# Patient Record
Sex: MALE | Race: ASIAN | NOT HISPANIC OR LATINO | Employment: FULL TIME | URBAN - METROPOLITAN AREA
[De-identification: names, ages, dates, MRNs, and addresses within clinical notes are randomized per-mention and may not be internally consistent; named-entity substitution may affect disease eponyms.]

---

## 2018-12-07 ENCOUNTER — OFFICE VISIT (OUTPATIENT)
Dept: URGENT CARE | Facility: CLINIC | Age: 23
End: 2018-12-07

## 2018-12-07 VITALS
RESPIRATION RATE: 14 BRPM | HEIGHT: 69 IN | TEMPERATURE: 99.5 F | BODY MASS INDEX: 19.99 KG/M2 | OXYGEN SATURATION: 100 % | HEART RATE: 66 BPM | WEIGHT: 135 LBS | SYSTOLIC BLOOD PRESSURE: 104 MMHG | DIASTOLIC BLOOD PRESSURE: 76 MMHG

## 2018-12-07 DIAGNOSIS — S66.811A STRAIN OF METACARPOPHALANGEAL JOINT OF RIGHT HAND, INITIAL ENCOUNTER: ICD-10-CM

## 2018-12-07 DIAGNOSIS — K52.9 GASTROENTERITIS: Primary | ICD-10-CM

## 2018-12-07 PROCEDURE — 99204 OFFICE O/P NEW MOD 45 MIN: CPT | Performed by: PHYSICIAN ASSISTANT

## 2018-12-07 RX ORDER — DICYCLOMINE HCL 20 MG
20 TABLET ORAL EVERY 6 HOURS
Qty: 20 TAB | Refills: 0 | Status: SHIPPED | OUTPATIENT
Start: 2018-12-07 | End: 2018-12-12

## 2018-12-07 RX ORDER — ONDANSETRON 4 MG/1
4 TABLET, ORALLY DISINTEGRATING ORAL EVERY 8 HOURS PRN
Qty: 10 TAB | Refills: 0 | Status: SHIPPED | OUTPATIENT
Start: 2018-12-07 | End: 2018-12-10

## 2018-12-07 NOTE — LETTER
December 7, 2018       Patient: Hi Fox   YOB: 1995   Date of Visit: 12/7/2018         To Whom It May Concern:    It is my medical opinion that Hi Fox may be excused from work for the dates of 12/7/18-12/10/18.      If you have any questions or concerns, please don't hesitate to call [phone]          Sincerely,          Julio Garcia P.A.-C.  Electronically Signed

## 2018-12-08 NOTE — PROGRESS NOTES
Subjective:      Pt is a 22 y.o. male who presents with Abdominal Pressure (lower abdominal pain)            HPI  This is a new problem. The current episode started in the past 1-2 days. The problem occurs 2 to 4 times per day. The problem has been gradually worsening. The stool consistency is described as watery. The patient states that diarrhea awakens them from sleep. Nothing aggravates the symptoms. Risk factors include suspect food intake. They have tried anti-motility drug for the symptoms. The treatment provided mild relief. There is no history of bowel resection, inflammatory bowel disease, irritable bowel syndrome, malabsorption, a recent abdominal surgery or short gut syndrome.   Pt states for the last 1-2 days, they have had abd cramping, watery diarrhea, with nausea and vomiting. Pt denies blood or mucus in the stool. Pt suspects contaminated food as etiology. Pt states OTC Pepto is mildly helping.  Pt has not taken any Rx medications for this condition. PT states the pain is a 6/10, aching in nature and worse at night. Pt also notes injury to right 2nd knuckle last year and wants to see a specialist to evaluate it. Pt denies CP, SOB,  paresthesias, headaches, dizziness, change in vision, hives, or joint pain. The pt's medication list, problem list, and allergies have been evaluated and reviewed during today's visit.     PMH:  Negative per pt.      PSH:  Negative per pt.      Fam Hx:  the patient's family history is not pertinent to their current complaint      Soc HX:  Social History     Social History   • Marital status: Single     Spouse name: N/A   • Number of children: N/A   • Years of education: N/A     Occupational History   • Not on file.     Social History Main Topics   • Smoking status: Never Smoker   • Smokeless tobacco: Never Used   • Alcohol use Not on file   • Drug use: Unknown   • Sexual activity: Not on file     Other Topics Concern   • Not on file     Social History Narrative   • No  "narrative on file         Medications:    Current Outpatient Prescriptions:   •  dicyclomine (BENTYL) 20 MG Tab, Take 1 Tab by mouth every 6 hours for 5 days., Disp: 20 Tab, Rfl: 0  •  ondansetron (ZOFRAN ODT) 4 MG TABLET DISPERSIBLE, Take 1 Tab by mouth every 8 hours as needed for up to 3 days., Disp: 10 Tab, Rfl: 0      Allergies:  Amoxicillin and Penicillins    ROS  Constitutional: Negative for fever, chills and malaise/fatigue.   HENT: Negative for congestion and sore throat.    Eyes: Negative for blurred vision, double vision and photophobia.   Respiratory: Negative for cough and shortness of breath.  Cardiovascular: Negative for chest pain and palpitations.   Gastrointestinal: POS nausea, vomiting, abdominal pain, diarrhea   Genitourinary: Negative for dysuria and flank pain.   Musculoskeletal: POS for right knuckle deformity.   Skin: Negative for itching and rash.   Neurological: Negative for dizziness, tingling and headaches.   Endo/Heme/Allergies: Does not bruise/bleed easily.   Psychiatric/Behavioral: Negative for depression. The patient is not nervous/anxious.           Objective:     /76 (BP Location: Right arm, Patient Position: Sitting, BP Cuff Size: Adult)   Pulse 66   Temp 37.5 °C (99.5 °F) (Temporal)   Resp 14   Ht 1.753 m (5' 9\")   Wt 61.2 kg (135 lb)   SpO2 100%   BMI 19.94 kg/m²      Physical Exam   Abdominal: Soft. Normal appearance. He exhibits no shifting dullness, no distension, no pulsatile liver, no fluid wave, no abdominal bruit, no ascites, no pulsatile midline mass and no mass. Bowel sounds are increased. There is no hepatosplenomegaly. There is generalized tenderness. There is no rigidity, no rebound, no guarding, no CVA tenderness, no tenderness at McBurney's point and negative Larson's sign. No hernia.       Musculoskeletal:        Right hand: He exhibits deformity. He exhibits normal range of motion, no tenderness, no bony tenderness, normal capillary refill, no " laceration and no swelling. Normal sensation noted. Normal strength noted.        Hands:         Constitutional: PT is oriented to person, place, and time. PT appears well-developed and well-nourished. No distress.   HENT:   Head: Normocephalic and atraumatic.   Mouth/Throat: Oropharynx is clear and moist. No oropharyngeal exudate.   Eyes: Conjunctivae normal and EOM are normal. Pupils are equal, round, and reactive to light.   Neck: Normal range of motion. Neck supple. No thyromegaly present.   Cardiovascular: Normal rate, regular rhythm, normal heart sounds and intact distal pulses.  Exam reveals no gallop and no friction rub.    No murmur heard.  Pulmonary/Chest: Effort normal and breath sounds normal. No respiratory distress. PT has no wheezes. PT has no rales. Pt exhibits no tenderness.   Neurological: PT is alert and oriented to person, place, and time. PT has normal reflexes. No cranial nerve deficit.   Skin: Skin is warm and dry. No rash noted. PT is not diaphoretic. No erythema.       Psychiatric: PT has a normal mood and affect. PT behavior is normal. Judgment and thought content normal.        Assessment/Plan:     1. Gastroenteritis    - dicyclomine (BENTYL) 20 MG Tab; Take 1 Tab by mouth every 6 hours for 5 days.  Dispense: 20 Tab; Refill: 0  - ondansetron (ZOFRAN ODT) 4 MG TABLET DISPERSIBLE; Take 1 Tab by mouth every 8 hours as needed for up to 3 days.  Dispense: 10 Tab; Refill: 0    2. Strain of metacarpophalangeal joint of right hand, initial encounter    - REFERRAL TO SPORTS MEDICINE    Rest, fluids encouraged.  BLAND DIET encouraged  Note given for work.  AVS with medical info given.  Pt was in full understanding and agreement with the plan.  Follow-up as needed if symptoms worsen or fail to improve.

## 2022-05-03 ENCOUNTER — OFFICE VISIT (OUTPATIENT)
Dept: URGENT CARE | Facility: PHYSICIAN GROUP | Age: 27
End: 2022-05-03
Payer: COMMERCIAL

## 2022-05-03 ENCOUNTER — HOSPITAL ENCOUNTER (EMERGENCY)
Facility: MEDICAL CENTER | Age: 27
End: 2022-05-03
Attending: EMERGENCY MEDICINE
Payer: COMMERCIAL

## 2022-05-03 ENCOUNTER — APPOINTMENT (OUTPATIENT)
Dept: RADIOLOGY | Facility: MEDICAL CENTER | Age: 27
End: 2022-05-03
Attending: EMERGENCY MEDICINE
Payer: COMMERCIAL

## 2022-05-03 VITALS
HEIGHT: 69 IN | OXYGEN SATURATION: 99 % | DIASTOLIC BLOOD PRESSURE: 68 MMHG | SYSTOLIC BLOOD PRESSURE: 110 MMHG | WEIGHT: 125 LBS | TEMPERATURE: 97.7 F | HEART RATE: 72 BPM | BODY MASS INDEX: 18.51 KG/M2 | RESPIRATION RATE: 18 BRPM

## 2022-05-03 VITALS
WEIGHT: 128.31 LBS | HEIGHT: 69 IN | TEMPERATURE: 98.8 F | OXYGEN SATURATION: 99 % | SYSTOLIC BLOOD PRESSURE: 117 MMHG | DIASTOLIC BLOOD PRESSURE: 70 MMHG | RESPIRATION RATE: 14 BRPM | HEART RATE: 72 BPM | BODY MASS INDEX: 19 KG/M2

## 2022-05-03 DIAGNOSIS — R07.9 CHEST PAIN, UNSPECIFIED TYPE: ICD-10-CM

## 2022-05-03 DIAGNOSIS — H53.8 BLURRY VISION: ICD-10-CM

## 2022-05-03 LAB
ALBUMIN SERPL BCP-MCNC: 4.5 G/DL (ref 3.2–4.9)
ALBUMIN/GLOB SERPL: 1.8 G/DL
ALP SERPL-CCNC: 65 U/L (ref 30–99)
ALT SERPL-CCNC: 54 U/L (ref 2–50)
ANION GAP SERPL CALC-SCNC: 8 MMOL/L (ref 7–16)
AST SERPL-CCNC: 24 U/L (ref 12–45)
BASOPHILS # BLD AUTO: 1.3 % (ref 0–1.8)
BASOPHILS # BLD: 0.07 K/UL (ref 0–0.12)
BILIRUB SERPL-MCNC: 0.3 MG/DL (ref 0.1–1.5)
BUN SERPL-MCNC: 14 MG/DL (ref 8–22)
CALCIUM SERPL-MCNC: 9.2 MG/DL (ref 8.5–10.5)
CHLORIDE SERPL-SCNC: 101 MMOL/L (ref 96–112)
CO2 SERPL-SCNC: 28 MMOL/L (ref 20–33)
CREAT SERPL-MCNC: 0.91 MG/DL (ref 0.5–1.4)
EKG IMPRESSION: NORMAL
EOSINOPHIL # BLD AUTO: 0.89 K/UL (ref 0–0.51)
EOSINOPHIL NFR BLD: 16.9 % (ref 0–6.9)
ERYTHROCYTE [DISTWIDTH] IN BLOOD BY AUTOMATED COUNT: 43.1 FL (ref 35.9–50)
GFR SERPLBLD CREATININE-BSD FMLA CKD-EPI: 117 ML/MIN/1.73 M 2
GLOBULIN SER CALC-MCNC: 2.5 G/DL (ref 1.9–3.5)
GLUCOSE SERPL-MCNC: 94 MG/DL (ref 65–99)
HCT VFR BLD AUTO: 46.9 % (ref 42–52)
HGB BLD-MCNC: 15.1 G/DL (ref 14–18)
IMM GRANULOCYTES # BLD AUTO: 0.01 K/UL (ref 0–0.11)
IMM GRANULOCYTES NFR BLD AUTO: 0.2 % (ref 0–0.9)
LYMPHOCYTES # BLD AUTO: 1.76 K/UL (ref 1–4.8)
LYMPHOCYTES NFR BLD: 33.4 % (ref 22–41)
MCH RBC QN AUTO: 30 PG (ref 27–33)
MCHC RBC AUTO-ENTMCNC: 32.2 G/DL (ref 33.7–35.3)
MCV RBC AUTO: 93.1 FL (ref 81.4–97.8)
MONOCYTES # BLD AUTO: 0.33 K/UL (ref 0–0.85)
MONOCYTES NFR BLD AUTO: 6.3 % (ref 0–13.4)
NEUTROPHILS # BLD AUTO: 2.21 K/UL (ref 1.82–7.42)
NEUTROPHILS NFR BLD: 41.9 % (ref 44–72)
NRBC # BLD AUTO: 0 K/UL
NRBC BLD-RTO: 0 /100 WBC
PLATELET # BLD AUTO: 274 K/UL (ref 164–446)
PMV BLD AUTO: 8.6 FL (ref 9–12.9)
POTASSIUM SERPL-SCNC: 4.1 MMOL/L (ref 3.6–5.5)
PROT SERPL-MCNC: 7 G/DL (ref 6–8.2)
RBC # BLD AUTO: 5.04 M/UL (ref 4.7–6.1)
SODIUM SERPL-SCNC: 137 MMOL/L (ref 135–145)
TROPONIN T SERPL-MCNC: 6 NG/L (ref 6–19)
WBC # BLD AUTO: 5.3 K/UL (ref 4.8–10.8)

## 2022-05-03 PROCEDURE — 99204 OFFICE O/P NEW MOD 45 MIN: CPT | Performed by: NURSE PRACTITIONER

## 2022-05-03 PROCEDURE — 71045 X-RAY EXAM CHEST 1 VIEW: CPT

## 2022-05-03 PROCEDURE — 84484 ASSAY OF TROPONIN QUANT: CPT

## 2022-05-03 PROCEDURE — 85025 COMPLETE CBC W/AUTO DIFF WBC: CPT

## 2022-05-03 PROCEDURE — 80053 COMPREHEN METABOLIC PANEL: CPT

## 2022-05-03 PROCEDURE — 99283 EMERGENCY DEPT VISIT LOW MDM: CPT

## 2022-05-03 PROCEDURE — 93005 ELECTROCARDIOGRAM TRACING: CPT | Performed by: EMERGENCY MEDICINE

## 2022-05-03 PROCEDURE — 93005 ELECTROCARDIOGRAM TRACING: CPT

## 2022-05-03 ASSESSMENT — ENCOUNTER SYMPTOMS
FEVER: 0
HEADACHES: 0

## 2022-05-03 NOTE — ED TRIAGE NOTES
Ambulates to triage after an EKG  Chief Complaint   Patient presents with   • Sent from Urgent Care   • Chest Pain     Constant for the last hour, but was on and of since last night     Pt started having CP last night at work, went home and slept and still had the pain. Went to  but was immediately sent here. Pain is non radiating, c/o of SOB but does not get winded when talking.

## 2022-05-03 NOTE — PROGRESS NOTES
"Hi Fox is a 26 y.o. male who presents for Blurred Vision and Chest Pain      HPI this is a new problem. C/o chest pain started last night at approx midnight. Pain in chest is center left.  No unusual activity. 2 am he noticed that he was having blurry vision.  Symptoms started when he was working. He takes shuttle home and had to stay at work.  Pain 6-8/10 intermittent pain in chest. He has schedule an eye doctor appointment for tomorrow due to the vision change.     Review of Systems   Constitutional: Negative for fever.   Neurological: Negative for headaches.       Allergies:       Allergies   Allergen Reactions   • Amoxicillin Hives   • Penicillins Hives       PMSFS Hx:  History reviewed. No pertinent past medical history.  History reviewed. No pertinent surgical history.  History reviewed. No pertinent family history.  Social History     Tobacco Use   • Smoking status: Current Every Day Smoker   • Smokeless tobacco: Never Used   Substance Use Topics   • Alcohol use: Not on file       Problems:   There is no problem list on file for this patient.      Medications:   No current outpatient medications on file prior to visit.     No current facility-administered medications on file prior to visit.          Objective:     /68   Pulse 72   Temp 36.5 °C (97.7 °F) (Temporal)   Resp 18   Ht 1.753 m (5' 9\")   Wt 56.7 kg (125 lb)   SpO2 99%   BMI 18.46 kg/m²     Physical Exam  Vitals and nursing note reviewed.   Constitutional:       General: He is not in acute distress.     Appearance: Normal appearance. He is well-developed, well-groomed and normal weight. He is not ill-appearing.   HENT:      Head: Normocephalic.   Eyes:      General: Lids are normal.      Extraocular Movements: Extraocular movements intact.      Conjunctiva/sclera: Conjunctivae normal.      Comments: Abnormal visual acuity    Cardiovascular:      Rate and Rhythm: Normal rate and regular rhythm.      Pulses: Normal pulses.      " Heart sounds: Normal heart sounds.   Pulmonary:      Effort: Pulmonary effort is normal.      Breath sounds: Normal breath sounds.   Chest:   Breasts:      Right: No supraclavicular adenopathy.      Left: No supraclavicular adenopathy.       Lymphadenopathy:      Cervical: No cervical adenopathy.      Upper Body:      Right upper body: No supraclavicular adenopathy.      Left upper body: No supraclavicular adenopathy.   Skin:     General: Skin is warm.      Capillary Refill: Capillary refill takes less than 2 seconds.   Neurological:      Mental Status: He is alert and oriented to person, place, and time.   Psychiatric:         Mood and Affect: Mood normal.         Behavior: Behavior normal. Behavior is cooperative.         Thought Content: Thought content normal.           Assessment /Associated Orders:      1. Chest pain, unspecified type     2. Blurry vision           Medical Decision Making:        Pt's clinical presentation and exam today indicate a need for higher level of care with further evaluation and/or diagnostics.   [facility] transfer center was  called to arrange transfer to higher level of care in ER.  Pt is to be transported via POV  I have reiterated to patient that although an Urgent Care to ER transfer was made this will not necessarily expedite the ER process

## 2022-05-04 NOTE — ED PROVIDER NOTES
"ER Provider Note     Scribed for Seth Smith M.D. by Bjorn Blandon. 5/3/2022, 5:21 PM.    Primary Care Provider: Pcp Pt States None  Means of Arrival: Walk-in  History obtained from: Patient  History limited by: None    CHIEF COMPLAINT  Chief Complaint   Patient presents with    Sent from Urgent Care    Chest Pain     Constant for the last hour, but was on and of since last night     HPI  Hi Fox is a 26 y.o. male who presents to the Emergency Department for constant non radiating chest pain onset last night. The patient states his chest pain was intermittent throughout last night after work. He states that last year he had similar symptoms, but was diagnosed with anxiety. He went to the urgent care earlier today but was sent to the ER for evaluation. He reports associated shortness of breath and headache. No alleviating or exacerbating factors were identified. He denies associated fever,     REVIEW OF SYSTEMS  See HPI for further details. All other systems are negative.    PAST MEDICAL HISTORY   None noted.    SURGICAL HISTORY  patient denies any surgical history    SOCIAL HISTORY  Social History     Tobacco Use    Smoking status: Current Every Day Smoker    Smokeless tobacco: Never Used   Vaping Use    Vaping Use: Never used   Substance Use Topics    Alcohol use: Never    Drug use: Never      Social History     Substance and Sexual Activity   Drug Use Never     FAMILY HISTORY  History reviewed. No pertinent family history.    CURRENT MEDICATIONS  Home Medications       Reviewed by Adela Ray R.N. (Registered Nurse) on 05/03/22 at 1625  Med List Status: Complete     Medication Last Dose Status        Patient Alexei Taking any Medications                         ALLERGIES  Allergies   Allergen Reactions    Amoxicillin Hives    Penicillins Hives     PHYSICAL EXAM  VITAL SIGNS: BP (!) 99/66   Pulse 85   Temp 36.7 °C (98.1 °F) (Temporal)   Resp 16   Ht 1.753 m (5' 9\")   Wt 58.2 kg (128 lb 4.9 " oz)   SpO2 99%   BMI 18.95 kg/m²   Constitutional: Alert in no apparent distress.  HENT: No signs of trauma, Bilateral external ears normal, Nose normal.   Eyes: Pupils are equal and reactive, Conjunctiva normal, Non-icteric.   Neck: Normal range of motion, No tenderness, Supple, No stridor.   Lymphatic: No lymphadenopathy noted.   Cardiovascular: Regular rate and rhythm, no palpable thrill  Thorax & Lungs: No respiratory distress,  No chest tenderness.  CTAB  Abdomen: Bowel sounds normal, Soft, No tenderness, No masses, No pulsatile masses. No peritoneal signs.  Skin: Warm, Dry, No erythema, No rash.   Back: No bony tenderness, No CVA tenderness.   Extremities: Intact distal pulses, No edema, No tenderness, No cyanosis.  Musculoskeletal: Good range of motion in all major joints. No tenderness to palpation or major deformities noted.   Neurologic: Alert , Normal motor function, Normal sensory function, No focal deficits noted.   Psychiatric: Affect normal, Judgment normal, Mood normal.     DIAGNOSTIC STUDIES / PROCEDURES    EKG Interpretation:  Interpreted by me  12 Lead EKG interpreted by me to show:  Normal sinus rhythm  Rate 71  Axis: Normal  Intervals: Normal  Lateral Q waves  Normal T waves  Normal ST segments  My impression of this EKG: Does not indicate ischemia or arrhythmia at this time.     LABS  Labs Reviewed   CBC WITH DIFFERENTIAL - Abnormal; Notable for the following components:       Result Value    MCHC 32.2 (*)     MPV 8.6 (*)     Neutrophils-Polys 41.90 (*)     Eosinophils 16.90 (*)     Eos (Absolute) 0.89 (*)     All other components within normal limits   COMP METABOLIC PANEL - Abnormal; Notable for the following components:    ALT(SGPT) 54 (*)     All other components within normal limits   TROPONIN   ESTIMATED GFR     All labs reviewed by me.    RADIOLOGY  DX-CHEST-PORTABLE (1 VIEW)   Final Result         No acute cardiac or pulmonary abnormality is identified.         The radiologist's  interpretation of all radiological studies have been reviewed by me.    COURSE & MEDICAL DECISION MAKING  Pertinent Labs & Imaging studies reviewed. (See chart for details)    This is a 26 y.o. male that presents from urgent care with chest pain.  At this time will evaluate for myocardial ischemia as well as structural heart disease and then reassess..     5:21 PM - Patient seen and examined at bedside. Ordered DX-chest, CBC w/ diff, CMP, Troponin, and EKG.     5:54 PM - I reevaluated the patient at bedside. I discussed plan for discharge and follow up as outlined below. The patient is stable for discharge at this time and will return for any new or worsening symptoms. Patient verbalizes understanding and support with my plan for discharge.     Chest x-ray is negative.  Troponin is negative.  EKG is normal.  She has a low heart score.  Will discharge home with strict return precautions and follow-up.    The patient is referred to a primary physician for blood pressure management, diabetic screening, and for all other preventative health concerns.    DISPOSITION:  Patient will be discharged home in stable condition.    FOLLOW UP:  [facility]  [address] [facility] [address]  [phone]      FINAL IMPRESSION  1. Chest pain, unspecified type       I, Bjorn Blandon (Abner), am scribing for, and in the presence of, Seth Smith M.D..    Electronically signed by: Bjorn Blandon (Abner), 5/3/2022    ISeth M.D. personally performed the services described in this documentation, as scribed by Bjorn Blandon in my presence, and it is both accurate and complete. C.    The note accurately reflects work and decisions made by me.  Seth Smith M.D.  5/4/2022  12:29 AM       I have personally seen and examined this patient.  I have fully participated in the care of this patient. I have reviewed all pertinent clinical information, including history, physical exam, plan and the Resident’s note and agree except as noted.

## 2022-06-06 ENCOUNTER — HOSPITAL ENCOUNTER (OUTPATIENT)
Dept: LAB | Facility: MEDICAL CENTER | Age: 27
End: 2022-06-06
Attending: PHYSICIAN ASSISTANT
Payer: COMMERCIAL

## 2022-06-06 ENCOUNTER — TELEPHONE (OUTPATIENT)
Dept: URGENT CARE | Facility: CLINIC | Age: 27
End: 2022-06-06

## 2022-06-06 ENCOUNTER — OFFICE VISIT (OUTPATIENT)
Dept: URGENT CARE | Facility: CLINIC | Age: 27
End: 2022-06-06
Payer: COMMERCIAL

## 2022-06-06 VITALS
HEART RATE: 69 BPM | OXYGEN SATURATION: 99 % | BODY MASS INDEX: 18.07 KG/M2 | DIASTOLIC BLOOD PRESSURE: 62 MMHG | HEIGHT: 69 IN | RESPIRATION RATE: 18 BRPM | SYSTOLIC BLOOD PRESSURE: 100 MMHG | TEMPERATURE: 98 F | WEIGHT: 122 LBS

## 2022-06-06 DIAGNOSIS — R61 UNEXPLAINED NIGHT SWEATS: ICD-10-CM

## 2022-06-06 DIAGNOSIS — R05.9 COUGH: ICD-10-CM

## 2022-06-06 LAB
ALBUMIN SERPL BCP-MCNC: 4.8 G/DL (ref 3.2–4.9)
ALBUMIN/GLOB SERPL: 1.7 G/DL
ALP SERPL-CCNC: 67 U/L (ref 30–99)
ALT SERPL-CCNC: 66 U/L (ref 2–50)
ANION GAP SERPL CALC-SCNC: 10 MMOL/L (ref 7–16)
AST SERPL-CCNC: 30 U/L (ref 12–45)
BASOPHILS # BLD AUTO: 0.9 % (ref 0–1.8)
BASOPHILS # BLD: 0.06 K/UL (ref 0–0.12)
BILIRUB SERPL-MCNC: 0.4 MG/DL (ref 0.1–1.5)
BUN SERPL-MCNC: 14 MG/DL (ref 8–22)
CALCIUM SERPL-MCNC: 9.3 MG/DL (ref 8.5–10.5)
CHLORIDE SERPL-SCNC: 103 MMOL/L (ref 96–112)
CO2 SERPL-SCNC: 27 MMOL/L (ref 20–33)
CREAT SERPL-MCNC: 0.93 MG/DL (ref 0.5–1.4)
EOSINOPHIL # BLD AUTO: 0.39 K/UL (ref 0–0.51)
EOSINOPHIL NFR BLD: 5.9 % (ref 0–6.9)
ERYTHROCYTE [DISTWIDTH] IN BLOOD BY AUTOMATED COUNT: 44.3 FL (ref 35.9–50)
GFR SERPLBLD CREATININE-BSD FMLA CKD-EPI: 114 ML/MIN/1.73 M 2
GLOBULIN SER CALC-MCNC: 2.8 G/DL (ref 1.9–3.5)
GLUCOSE SERPL-MCNC: 102 MG/DL (ref 65–99)
HCT VFR BLD AUTO: 45 % (ref 42–52)
HGB BLD-MCNC: 14.4 G/DL (ref 14–18)
IMM GRANULOCYTES # BLD AUTO: 0.01 K/UL (ref 0–0.11)
IMM GRANULOCYTES NFR BLD AUTO: 0.2 % (ref 0–0.9)
LIPASE SERPL-CCNC: 42 U/L (ref 11–82)
LYMPHOCYTES # BLD AUTO: 1.52 K/UL (ref 1–4.8)
LYMPHOCYTES NFR BLD: 23.1 % (ref 22–41)
MCH RBC QN AUTO: 29.9 PG (ref 27–33)
MCHC RBC AUTO-ENTMCNC: 32 G/DL (ref 33.7–35.3)
MCV RBC AUTO: 93.4 FL (ref 81.4–97.8)
MONOCYTES # BLD AUTO: 0.67 K/UL (ref 0–0.85)
MONOCYTES NFR BLD AUTO: 10.2 % (ref 0–13.4)
NEUTROPHILS # BLD AUTO: 3.94 K/UL (ref 1.82–7.42)
NEUTROPHILS NFR BLD: 59.7 % (ref 44–72)
NRBC # BLD AUTO: 0 K/UL
NRBC BLD-RTO: 0 /100 WBC
PLATELET # BLD AUTO: 251 K/UL (ref 164–446)
PMV BLD AUTO: 9.5 FL (ref 9–12.9)
POTASSIUM SERPL-SCNC: 5 MMOL/L (ref 3.6–5.5)
PROT SERPL-MCNC: 7.6 G/DL (ref 6–8.2)
RBC # BLD AUTO: 4.82 M/UL (ref 4.7–6.1)
SODIUM SERPL-SCNC: 140 MMOL/L (ref 135–145)
TSH SERPL DL<=0.005 MIU/L-ACNC: 1.54 UIU/ML (ref 0.38–5.33)
WBC # BLD AUTO: 6.6 K/UL (ref 4.8–10.8)

## 2022-06-06 PROCEDURE — 99214 OFFICE O/P EST MOD 30 MIN: CPT | Performed by: PHYSICIAN ASSISTANT

## 2022-06-06 PROCEDURE — 84443 ASSAY THYROID STIM HORMONE: CPT

## 2022-06-06 PROCEDURE — 80053 COMPREHEN METABOLIC PANEL: CPT

## 2022-06-06 PROCEDURE — 85025 COMPLETE CBC W/AUTO DIFF WBC: CPT

## 2022-06-06 PROCEDURE — 83690 ASSAY OF LIPASE: CPT

## 2022-06-06 PROCEDURE — 36415 COLL VENOUS BLD VENIPUNCTURE: CPT

## 2022-06-06 RX ORDER — BENZONATATE 100 MG/1
100 CAPSULE ORAL 3 TIMES DAILY PRN
Qty: 60 CAPSULE | Refills: 0 | Status: SHIPPED | OUTPATIENT
Start: 2022-06-06 | End: 2022-06-12

## 2022-06-06 ASSESSMENT — FIBROSIS 4 INDEX: FIB4 SCORE: 0.33

## 2022-06-06 ASSESSMENT — ENCOUNTER SYMPTOMS
COUGH: 1
WHEEZING: 0
EYES NEGATIVE: 1
SHORTNESS OF BREATH: 1
GASTROINTESTINAL NEGATIVE: 1
CHILLS: 0
FEVER: 0
HEMOPTYSIS: 0
DIAPHORESIS: 1
WEIGHT LOSS: 0
NEUROLOGICAL NEGATIVE: 1
CARDIOVASCULAR NEGATIVE: 1

## 2022-06-06 NOTE — PROGRESS NOTES
"  Subjective:     Hi Fox  is a 26 y.o. male who presents for Cough (Getting up phlegm, night sweats every night/x2weeks)       He presents today with cough and frequent episodes of sweating/night sweats that have been ongoing for the last 2 weeks.  He is experiencing some tightness of breath and did have an episode of abdominal pain this morning.  Denies fevers, nausea/vomiting, constipation/diarrhea, urinary symptoms.  He denies any unexplained weight gain or weight loss.  He has not been taking any medications for symptoms.  He does smoke hookah.  Denies previously diagnosed cardiopulmonary pathology, denies previous thyroid pathology.     Review of Systems   Constitutional: Positive for diaphoresis. Negative for chills, fever, malaise/fatigue and weight loss.   HENT: Negative.    Eyes: Negative.    Respiratory: Positive for cough and shortness of breath. Negative for hemoptysis and wheezing.    Cardiovascular: Negative.    Gastrointestinal: Negative.    Genitourinary: Negative.    Neurological: Negative.       Allergies   Allergen Reactions   • Amoxicillin Hives   • Penicillins Hives     No past medical history on file.     Objective:   /62   Pulse 69   Temp 36.7 °C (98 °F) (Temporal)   Resp 18   Ht 1.753 m (5' 9\")   Wt 55.3 kg (122 lb)   SpO2 99%   BMI 18.02 kg/m²   Physical Exam  Vitals and nursing note reviewed.   Constitutional:       General: He is not in acute distress.     Appearance: Normal appearance. He is not ill-appearing, toxic-appearing or diaphoretic.   HENT:      Head: Normocephalic.      Right Ear: Tympanic membrane, ear canal and external ear normal. There is no impacted cerumen.      Left Ear: Tympanic membrane, ear canal and external ear normal. There is no impacted cerumen.      Nose: No congestion or rhinorrhea.      Mouth/Throat:      Mouth: Mucous membranes are moist.      Pharynx: No oropharyngeal exudate or posterior oropharyngeal erythema.   Eyes:      General:   "       Right eye: No discharge.         Left eye: No discharge.      Conjunctiva/sclera: Conjunctivae normal.   Cardiovascular:      Rate and Rhythm: Normal rate and regular rhythm.   Pulmonary:      Effort: Pulmonary effort is normal. No respiratory distress.      Breath sounds: Normal breath sounds. No stridor. No wheezing or rhonchi.   Musculoskeletal:      Cervical back: Neck supple.   Lymphadenopathy:      Cervical: No cervical adenopathy.   Neurological:      General: No focal deficit present.      Mental Status: He is alert and oriented to person, place, and time.   Psychiatric:         Mood and Affect: Mood normal.         Behavior: Behavior normal.         Thought Content: Thought content normal.         Judgment: Judgment normal.             Diagnostic testing:    TSH, T4, CBC, CMP, lipase    Assessment/Plan:     Encounter Diagnoses   Name Primary?   • Cough    • Unexplained night sweats         Plan for care for today's complaint includes Tessalon Perles for his cough.  We will perform TSH, free T4, CBC, CMP, lipase to evaluate his ongoing sweats/night sweats.  We will contact him via phone call with the results, pending the results of this we will adjust our treatment plan at that time.  If his symptoms get significantly worse he should return to urgent care or emergency department for reevaluation.  Prescription for Tessalon Perles provided.    See AVS Instructions below for written guidance provided to patient on after-visit management and care in addition to our verbal discussion during the visit.    Please note that this dictation was created using voice recognition software. I have attempted to correct all errors, but there may be sound-alike, spelling, grammar and possibly content errors that I did not discover before finalizing the note.    Eliud Clayton PA-C

## 2022-06-06 NOTE — LETTER
[facility]  [facility] [place] [id number] [facility] [id number]     June 6, 2022    Patient: Hi Fox   YOB: 1995   Date of Visit: 6/6/2022       To Whom It May Concern:    Hi Fox was seen and treated in our department on 6/6/2022.     Sincerely,     Eliud Clayton P.A.-C.

## 2022-06-06 NOTE — TELEPHONE ENCOUNTER
Did contact the patient via phone call to discuss results of recent CBC, CMP, TSH, lipase labs that were taken today.  No signs of thyroid disease, infection, or injury other underlying pathology that would correlate to his symptoms.  Did discuss with the patient that he should continue to monitor symptoms and return to the urgent care for symptoms or not improved in the next 5-7 days or sooner if symptoms get significantly worse.

## 2022-06-12 ENCOUNTER — APPOINTMENT (OUTPATIENT)
Dept: RADIOLOGY | Facility: IMAGING CENTER | Age: 27
End: 2022-06-12
Attending: EMERGENCY MEDICINE
Payer: COMMERCIAL

## 2022-06-12 ENCOUNTER — OFFICE VISIT (OUTPATIENT)
Dept: URGENT CARE | Facility: CLINIC | Age: 27
End: 2022-06-12
Payer: COMMERCIAL

## 2022-06-12 VITALS
TEMPERATURE: 97.7 F | DIASTOLIC BLOOD PRESSURE: 80 MMHG | HEIGHT: 69 IN | RESPIRATION RATE: 18 BRPM | HEART RATE: 90 BPM | BODY MASS INDEX: 18.07 KG/M2 | OXYGEN SATURATION: 98 % | WEIGHT: 122 LBS | SYSTOLIC BLOOD PRESSURE: 100 MMHG

## 2022-06-12 DIAGNOSIS — R05.9 COUGH: ICD-10-CM

## 2022-06-12 DIAGNOSIS — R07.89 RIGHT-SIDED CHEST WALL PAIN: ICD-10-CM

## 2022-06-12 PROCEDURE — 71046 X-RAY EXAM CHEST 2 VIEWS: CPT | Mod: TC | Performed by: FAMILY MEDICINE

## 2022-06-12 PROCEDURE — 99213 OFFICE O/P EST LOW 20 MIN: CPT | Performed by: EMERGENCY MEDICINE

## 2022-06-12 RX ORDER — BENZONATATE 100 MG/1
100 CAPSULE ORAL 3 TIMES DAILY PRN
Qty: 30 CAPSULE | Refills: 0 | Status: SHIPPED | OUTPATIENT
Start: 2022-06-12 | End: 2022-07-14

## 2022-06-12 ASSESSMENT — ENCOUNTER SYMPTOMS
FLANK PAIN: 1
SHORTNESS OF BREATH: 0
VOMITING: 0
COUGH: 1
DIAPHORESIS: 1
SPUTUM PRODUCTION: 1
SORE THROAT: 0
NAUSEA: 0

## 2022-06-12 ASSESSMENT — FIBROSIS 4 INDEX: FIB4 SCORE: 0.41

## 2022-06-12 NOTE — PROGRESS NOTES
"  Subjective:     Hi Fox  is a 26 y.o. male who presents for Cough ([place], pt states using tessalon cough pills, melted in his car. He needs refill. Sweats at night. (R) side pain. X 1 day  Needs doctors note for today. X cough 3 weeks. )  Patient is a 26-year-old male presents for evaluation of right flank/posterior lower chest pain.  He reports he has had 2 to 3 weeks of profuse episodes of sweating, continuing to cough up phlegm which is white and yellow.  Denies any shortness of breath per se not had measured fevers at home.  He reports that he has some abdominal pain but only due to the severe coughing episodes.  He denies any weight changes.  He reports he has not received COVID or flu vaccinations.  Patient denies any risk factors for TB, he has not been in skilled nursing recently, does not work at [employer] or homeless shelters.  He does work at [employer].  He called into work today so will need a work note for today.  He is a smoker.     HPI Review of Systems   Constitutional: Positive for diaphoresis.        Night sweats   HENT: Negative for sore throat.    Respiratory: Positive for cough and sputum production (white/yellow). Negative for shortness of breath.    Gastrointestinal: Negative for nausea and vomiting.   Genitourinary: Positive for flank pain (right). Negative for dysuria, frequency, hematuria and urgency.   Musculoskeletal:        Flank pain right   All other systems reviewed and are negative.     Allergies   Allergen Reactions   • Amoxicillin Hives   • Penicillins Hives     History reviewed. No pertinent past medical history.     Objective:   /80   Pulse 90   Temp 36.5 °C (97.7 °F)   Resp 18   Ht 1.753 m (5' 9\")   Wt 55.3 kg (122 lb)   SpO2 98%   BMI 18.02 kg/m²   Physical Exam  Vitals (Afebrile, oxygen sat 98%, normal respiratory rate) and nursing note reviewed.   Constitutional:       Appearance: Normal appearance. He is not ill-appearing, toxic-appearing or " diaphoretic.      Comments: Slender male, no cough during the course of the exam.   HENT:      Head: Normocephalic and atraumatic.      Nose: No rhinorrhea.      Mouth/Throat:      Mouth: Mucous membranes are moist.      Pharynx: No oropharyngeal exudate or posterior oropharyngeal erythema.   Eyes:      General: No scleral icterus.  Cardiovascular:      Rate and Rhythm: Normal rate and regular rhythm.      Heart sounds: Normal heart sounds. No murmur heard.  Pulmonary:      Effort: Pulmonary effort is normal. No respiratory distress.      Breath sounds: Normal breath sounds. No wheezing or rhonchi.   Chest:      Chest wall: Tenderness (right lower lateral chestwall on rib palpation) present.   Abdominal:      Tenderness: There is no right CVA tenderness or left CVA tenderness.   Musculoskeletal:         General: Normal range of motion.      Right lower leg: No edema.      Left lower leg: No edema.   Skin:     General: Skin is warm.      Capillary Refill: Capillary refill takes less than 2 seconds.      Findings: No rash.   Neurological:      Mental Status: He is alert and oriented to person, place, and time.   Psychiatric:         Mood and Affect: Mood normal.         Behavior: Behavior normal.         Thought Content: Thought content normal.           Assessment/Plan:     Encounter Diagnoses   Name Primary?   • Cough      Patient with prolonged cough and night sweats but no history for exposure to TB.  Ongoing symptoms for 2 to 3 weeks now with right lower chest pain.  We will obtain a chest x-ray to assure he is not hiding a pneumonia on that right side.  We will also check urine although based on history and physical doubt a renal etiology for his right flank area pain.  We will refill his Tessalon for symptomatic relief of cough    Chest x-ray: Films reviewed by me at 0 9:14 AM, radiology read:  IMPRESSION:     No acute cardiopulmonary abnormality.   Plan for care for today's complaint includes Supportive  measures, over-the-counter pain medications for chest wall pain, prescription for cough which is probably the cause of his chest wall pain..  Prescription for Tessalon provided.  Natural history, supportive care measures, and indications for immediate follow-up for Cough, chest wall pain discussed.    Referral provided for Establishing primary care.  Patient's questions answered.  Advised that patient arrange routine followup care with primary physician.  A Work excuse was given, return without restrictions on June 13.    See AVS Instructions below for written guidance provided to patient on after-visit management and care in addition to our verbal discussion during the visit.    Please note that this dictation was created using voice recognition software. I have attempted to correct all errors, but there may be sound-alike, spelling, grammar and possibly content errors that I did not discover before finalizing the note.

## 2022-06-12 NOTE — PATIENT INSTRUCTIONS
You may take acetaminophen, ibuprofen or naproxen as directed for pain.  You may alternate 650-1000 mg (2 tabs) acetaminophen with 600 mg (3 tabs) ibuprofen if needed for pain.     Use Tessalon as directed for cough.

## 2022-06-12 NOTE — LETTER
[facility] [place] [id number] [facility] [id number]     June 12, 2022    Patient: Hi Fox   YOB: 1995   Date of Visit: 6/12/2022       To Whom It May Concern:    Hi Fox was seen and treated in our department on 6/12/2022. Please excuse the patient from work for the following dates: June 12  He may return without restrictions on: June 13, 2022      Sincerely,     Cathy Chopra M.D.

## 2022-06-13 ENCOUNTER — TELEPHONE (OUTPATIENT)
Dept: URGENT CARE | Facility: CLINIC | Age: 27
End: 2022-06-13

## 2022-06-13 ENCOUNTER — TELEPHONE (OUTPATIENT)
Dept: URGENT CARE | Facility: PHYSICIAN GROUP | Age: 27
End: 2022-06-13
Payer: COMMERCIAL

## 2022-06-13 NOTE — TELEPHONE ENCOUNTER
Did contact the patient again after receiving message that he would like further explanation of his labs.  We did discuss the results of his CBC, CMP, TSH, lipase labs that were taken after his office visit with me.  He was seen in the urgent care yesterday and work-up for significant underlying pathology was negative, referral for primary care was ordered at that time.  He should follow-up with primary care for continued evaluation and management of his symptoms

## 2022-06-20 SDOH — HEALTH STABILITY: PHYSICAL HEALTH: ON AVERAGE, HOW MANY MINUTES DO YOU ENGAGE IN EXERCISE AT THIS LEVEL?: 150+ MIN

## 2022-06-20 SDOH — ECONOMIC STABILITY: FOOD INSECURITY: WITHIN THE PAST 12 MONTHS, YOU WORRIED THAT YOUR FOOD WOULD RUN OUT BEFORE YOU GOT MONEY TO BUY MORE.: SOMETIMES TRUE

## 2022-06-20 SDOH — ECONOMIC STABILITY: HOUSING INSECURITY
IN THE LAST 12 MONTHS, WAS THERE A TIME WHEN YOU DID NOT HAVE A STEADY PLACE TO SLEEP OR SLEPT IN A SHELTER (INCLUDING NOW)?: NO

## 2022-06-20 SDOH — ECONOMIC STABILITY: TRANSPORTATION INSECURITY
IN THE PAST 12 MONTHS, HAS LACK OF TRANSPORTATION KEPT YOU FROM MEETINGS, WORK, OR FROM GETTING THINGS NEEDED FOR DAILY LIVING?: NO

## 2022-06-20 SDOH — ECONOMIC STABILITY: INCOME INSECURITY: HOW HARD IS IT FOR YOU TO PAY FOR THE VERY BASICS LIKE FOOD, HOUSING, MEDICAL CARE, AND HEATING?: SOMEWHAT HARD

## 2022-06-20 SDOH — ECONOMIC STABILITY: TRANSPORTATION INSECURITY
IN THE PAST 12 MONTHS, HAS LACK OF RELIABLE TRANSPORTATION KEPT YOU FROM MEDICAL APPOINTMENTS, MEETINGS, WORK OR FROM GETTING THINGS NEEDED FOR DAILY LIVING?: NO

## 2022-06-20 SDOH — ECONOMIC STABILITY: HOUSING INSECURITY
IN THE LAST 12 MONTHS, WAS THERE A TIME WHEN YOU DID NOT HAVE A STEADY PLACE TO SLEEP OR SLEPT IN A SHELTER (INCLUDING NOW)?: YES

## 2022-06-20 SDOH — HEALTH STABILITY: PHYSICAL HEALTH: ON AVERAGE, HOW MANY DAYS PER WEEK DO YOU ENGAGE IN MODERATE TO STRENUOUS EXERCISE (LIKE A BRISK WALK)?: 7 DAYS

## 2022-06-20 SDOH — ECONOMIC STABILITY: INCOME INSECURITY: IN THE LAST 12 MONTHS, WAS THERE A TIME WHEN YOU WERE NOT ABLE TO PAY THE MORTGAGE OR RENT ON TIME?: YES

## 2022-06-20 SDOH — ECONOMIC STABILITY: FOOD INSECURITY: WITHIN THE PAST 12 MONTHS, THE FOOD YOU BOUGHT JUST DIDN'T LAST AND YOU DIDN'T HAVE MONEY TO GET MORE.: SOMETIMES TRUE

## 2022-06-20 SDOH — ECONOMIC STABILITY: HOUSING INSECURITY: IN THE LAST 12 MONTHS, HOW MANY PLACES HAVE YOU LIVED?: 3

## 2022-06-20 SDOH — ECONOMIC STABILITY: TRANSPORTATION INSECURITY
IN THE PAST 12 MONTHS, HAS THE LACK OF TRANSPORTATION KEPT YOU FROM MEDICAL APPOINTMENTS OR FROM GETTING MEDICATIONS?: NO

## 2022-06-20 SDOH — HEALTH STABILITY: MENTAL HEALTH
STRESS IS WHEN SOMEONE FEELS TENSE, NERVOUS, ANXIOUS, OR CAN'T SLEEP AT NIGHT BECAUSE THEIR MIND IS TROUBLED. HOW STRESSED ARE YOU?: RATHER MUCH

## 2022-06-20 ASSESSMENT — LIFESTYLE VARIABLES
HOW MANY STANDARD DRINKS CONTAINING ALCOHOL DO YOU HAVE ON A TYPICAL DAY: 3 OR 4
HOW OFTEN DO YOU HAVE SIX OR MORE DRINKS ON ONE OCCASION: NEVER
SKIP TO QUESTIONS 9-10: 0
HOW OFTEN DO YOU HAVE A DRINK CONTAINING ALCOHOL: 2-4 TIMES A MONTH
AUDIT-C TOTAL SCORE: 3

## 2022-06-20 ASSESSMENT — SOCIAL DETERMINANTS OF HEALTH (SDOH)
HOW OFTEN DO YOU ATTENT MEETINGS OF THE CLUB OR ORGANIZATION YOU BELONG TO?: NEVER
DO YOU BELONG TO ANY CLUBS OR ORGANIZATIONS SUCH AS CHURCH GROUPS UNIONS, FRATERNAL OR ATHLETIC GROUPS, OR SCHOOL GROUPS?: NO
IN A TYPICAL WEEK, HOW MANY TIMES DO YOU TALK ON THE PHONE WITH FAMILY, FRIENDS, OR NEIGHBORS?: ONCE A WEEK
HOW HARD IS IT FOR YOU TO PAY FOR THE VERY BASICS LIKE FOOD, HOUSING, MEDICAL CARE, AND HEATING?: SOMEWHAT HARD
IN A TYPICAL WEEK, HOW MANY TIMES DO YOU TALK ON THE PHONE WITH FAMILY, FRIENDS, OR NEIGHBORS?: ONCE A WEEK
HOW MANY DRINKS CONTAINING ALCOHOL DO YOU HAVE ON A TYPICAL DAY WHEN YOU ARE DRINKING: 3 OR 4
DO YOU BELONG TO ANY CLUBS OR ORGANIZATIONS SUCH AS CHURCH GROUPS UNIONS, FRATERNAL OR ATHLETIC GROUPS, OR SCHOOL GROUPS?: NO
ARE YOU MARRIED, WIDOWED, DIVORCED, SEPARATED, NEVER MARRIED, OR LIVING WITH A PARTNER?: NEVER MARRIED
HOW OFTEN DO YOU GET TOGETHER WITH FRIENDS OR RELATIVES?: TWICE A WEEK
WITHIN THE PAST 12 MONTHS, YOU WORRIED THAT YOUR FOOD WOULD RUN OUT BEFORE YOU GOT THE MONEY TO BUY MORE: SOMETIMES TRUE
HOW OFTEN DO YOU ATTENT MEETINGS OF THE CLUB OR ORGANIZATION YOU BELONG TO?: NEVER
HOW OFTEN DO YOU HAVE A DRINK CONTAINING ALCOHOL: 2-4 TIMES A MONTH
HOW OFTEN DO YOU ATTEND CHURCH OR RELIGIOUS SERVICES?: NEVER
HOW OFTEN DO YOU HAVE SIX OR MORE DRINKS ON ONE OCCASION: NEVER
ARE YOU MARRIED, WIDOWED, DIVORCED, SEPARATED, NEVER MARRIED, OR LIVING WITH A PARTNER?: NEVER MARRIED
HOW OFTEN DO YOU ATTEND CHURCH OR RELIGIOUS SERVICES?: NEVER
HOW OFTEN DO YOU GET TOGETHER WITH FRIENDS OR RELATIVES?: TWICE A WEEK

## 2022-06-23 ENCOUNTER — APPOINTMENT (OUTPATIENT)
Dept: MEDICAL GROUP | Facility: PHYSICIAN GROUP | Age: 27
End: 2022-06-23
Payer: COMMERCIAL

## 2022-07-14 ENCOUNTER — OFFICE VISIT (OUTPATIENT)
Dept: MEDICAL GROUP | Facility: PHYSICIAN GROUP | Age: 27
End: 2022-07-14
Payer: COMMERCIAL

## 2022-07-14 VITALS
RESPIRATION RATE: 16 BRPM | OXYGEN SATURATION: 99 % | WEIGHT: 127 LBS | SYSTOLIC BLOOD PRESSURE: 96 MMHG | TEMPERATURE: 97.4 F | BODY MASS INDEX: 18.81 KG/M2 | DIASTOLIC BLOOD PRESSURE: 62 MMHG | HEART RATE: 70 BPM | HEIGHT: 69 IN

## 2022-07-14 DIAGNOSIS — Z00.00 WELL ADULT EXAM: ICD-10-CM

## 2022-07-14 DIAGNOSIS — F17.290 PIPE SMOKER: ICD-10-CM

## 2022-07-14 DIAGNOSIS — Z76.89 ENCOUNTER TO ESTABLISH CARE WITH NEW DOCTOR: ICD-10-CM

## 2022-07-14 DIAGNOSIS — Z23 NEED FOR VACCINATION: ICD-10-CM

## 2022-07-14 DIAGNOSIS — F41.9 ANXIETY: ICD-10-CM

## 2022-07-14 PROCEDURE — 90471 IMMUNIZATION ADMIN: CPT | Performed by: INTERNAL MEDICINE

## 2022-07-14 PROCEDURE — 99214 OFFICE O/P EST MOD 30 MIN: CPT | Mod: 25 | Performed by: INTERNAL MEDICINE

## 2022-07-14 PROCEDURE — 90746 HEPB VACCINE 3 DOSE ADULT IM: CPT | Performed by: INTERNAL MEDICINE

## 2022-07-14 RX ORDER — CITALOPRAM 20 MG/1
20 TABLET ORAL DAILY
Qty: 30 TABLET | Refills: 5 | Status: SHIPPED | OUTPATIENT
Start: 2022-07-14 | End: 2022-08-25

## 2022-07-14 ASSESSMENT — FIBROSIS 4 INDEX: FIB4 SCORE: 0.41

## 2022-07-14 ASSESSMENT — PATIENT HEALTH QUESTIONNAIRE - PHQ9: CLINICAL INTERPRETATION OF PHQ2 SCORE: 0

## 2022-07-14 NOTE — ASSESSMENT & PLAN NOTE
Patient reported that this is a chronic recurrent condition has been noted for quite some time..  The patient reported recurrent anxiety especially when he is in crowded places or in small confined areas.  Patient reported rapid heartbeat, feeling tense...  Patient denies SI.    Past medical history anxiety    Surgical history none reported    Family history as in epic    Social history.  The patient is pipe smoker.  Advised the patient is important to discontinue

## 2022-07-14 NOTE — PROGRESS NOTES
"PRIMARY CARE CLINIC VISIT  Chief Complaint   Patient presents with   • New Patient     Establish care  Recurrent anxiety    History of Present Illness     Encounter to establish care with new doctor  Patient present today to establish care with new primary care provider.    Anxiety  Patient reported that this is a chronic recurrent condition has been noted for quite some time..  The patient reported recurrent anxiety especially when he is in crowded places or in small confined areas.  Patient reported rapid heartbeat, feeling tense...  Patient denies SI.    Past medical history anxiety    Surgical history none reported    Family history as in epic    Social history.  The patient is pipe smoker.  Advised the patient is important to discontinue        No current outpatient medications on file prior to visit.     No current facility-administered medications on file prior to visit.        Allergies: Amoxicillin and Penicillins    ROS  As per HPI above. All other systems reviewed and negative.      Past Medical, Social, and Family history reviewed and updated in EPIC     Objective     BP (!) 96/62 (BP Location: Right arm, Patient Position: Sitting, BP Cuff Size: Adult)   Pulse 70   Temp 36.3 °C (97.4 °F) (Temporal)   Resp 16   Ht 1.753 m (5' 9\")   Wt 57.6 kg (127 lb)   SpO2 99%    Body mass index is 18.75 kg/m².    General: alert and oriented  Cardiovascular: regular rate and rhythm  Pulmonary: lungs : no wheezing   Gastrointestinal: BS present. No obvious mass noted  Neuro cranial nerves II to XII grossly intact nonfocal exam        Assessment and Plan     1. Encounter to establish care with new doctor    2. Anxiety  Chronic condition.  Uncontrolled.  Brief counseling offered in the office today.  - Referral to Behavioral Health  Other orders  - citalopram (CELEXA) 20 MG Tab; Take 1 Tablet by mouth every day.  Dispense: 30 Tablet; Refill: 5  Recommend follow-up approximately 4 weeks    3. Pipe smoker  I spent 5 " minutes of face to face counseling pt regarding smoking cessation.   Discussed w pt the potential risks/hazards of tobacco.    Strongly advised pt to quit.  We discussed pharmacotherapy options for quiting including nicotine replacement.    4. Need for vaccination  - Hepatitis B Vaccine Adult 20+    5. Well adult exam  Routine lab work ordered.  Advised the patient to follow-up after blood test done  - HEMOGLOBIN A1C; Future  - Basic Metabolic Panel; Future  - CBC WITHOUT DIFFERENTIAL; Future  - Lipid Profile; Future  - TSH; Future  - MICROALBUMIN CREAT RATIO URINE; Future  - VITAMIN D,25 HYDROXY; Future      Health maintenance the patient will check his record and give us an update regarding Tdap and pneumonia vaccination.                 Please note that this dictation was created using voice recognition software. I have made every reasonable attempt to correct obvious errors but there may be errors of grammar and content that I may have overlooked prior to finalization of this note.      Jonathan Mckinnon MD  Internal Medicine  [facility]

## 2022-08-25 ENCOUNTER — OFFICE VISIT (OUTPATIENT)
Dept: MEDICAL GROUP | Facility: PHYSICIAN GROUP | Age: 27
End: 2022-08-25
Payer: COMMERCIAL

## 2022-08-25 VITALS
SYSTOLIC BLOOD PRESSURE: 94 MMHG | TEMPERATURE: 98.8 F | OXYGEN SATURATION: 98 % | DIASTOLIC BLOOD PRESSURE: 56 MMHG | BODY MASS INDEX: 18.81 KG/M2 | RESPIRATION RATE: 16 BRPM | WEIGHT: 127 LBS | HEART RATE: 97 BPM | HEIGHT: 69 IN

## 2022-08-25 DIAGNOSIS — F41.9 ANXIETY: ICD-10-CM

## 2022-08-25 DIAGNOSIS — R73.03 PREDIABETES: ICD-10-CM

## 2022-08-25 DIAGNOSIS — F17.290 PIPE SMOKER: ICD-10-CM

## 2022-08-25 DIAGNOSIS — R74.8 LIVER ENZYME ELEVATION: ICD-10-CM

## 2022-08-25 PROCEDURE — 99406 BEHAV CHNG SMOKING 3-10 MIN: CPT | Performed by: INTERNAL MEDICINE

## 2022-08-25 PROCEDURE — 99214 OFFICE O/P EST MOD 30 MIN: CPT | Mod: 25 | Performed by: INTERNAL MEDICINE

## 2022-08-25 RX ORDER — CITALOPRAM 30 MG/1
CAPSULE ORAL
Qty: 30 CAPSULE | Refills: 5 | Status: SHIPPED | OUTPATIENT
Start: 2022-08-25 | End: 2022-09-08

## 2022-08-25 RX ORDER — CITALOPRAM 30 MG/1
30 CAPSULE ORAL DAILY
Qty: 30 CAPSULE | Refills: 5 | Status: SHIPPED | OUTPATIENT
Start: 2022-08-25 | End: 2022-08-25

## 2022-08-25 ASSESSMENT — FIBROSIS 4 INDEX: FIB4 SCORE: 0.41

## 2022-08-25 NOTE — PROGRESS NOTES
"PRIMARY CARE CLINIC VISIT  Chief Complaint   Patient presents with    Follow-Up     Follow-up anxiety  Lab test results    History of Present Illness     Anxiety  This is a chronic condition.  The patient is presently taking citalopram 20 Mg daily.  The patient reported some improvement since taking the medication however he still noted episode of anxiety.  Patient denies significant side effects.  The patient wishes to try a higher dose.    Liver enzyme elevation  Chart review showed the liver enzymes mildly elevated.  Patient does not drink alcohol.  I am requesting the liver panel to be repeated along with hepatitis serology.  Liver ultrasound also ordered.    Pipe smoker  Strongly advised the patient is important to quit    Prediabetes  Noted with previous lab test.  Recommend diet and exercise.  Lab tests ordered for follow-up    No current outpatient medications on file prior to visit.     No current facility-administered medications on file prior to visit.        Allergies: Amoxicillin and Penicillins    ROS  As per HPI above. All other systems reviewed and negative.      Past Medical, Social, and Family history reviewed and updated in EPIC     Objective     BP (!) 94/56 (BP Location: Left arm, Patient Position: Sitting, BP Cuff Size: Adult)   Pulse 97   Temp 37.1 °C (98.8 °F) (Temporal)   Resp 16   Ht 1.753 m (5' 9\")   Wt 57.6 kg (127 lb)   SpO2 98%    Body mass index is 18.75 kg/m².    General: alert in no apparent distress.  Cardiovascular: regular rate and rhythm  Pulmonary: lungs : no wheezing   Gastrointestinal: BS present. No obvious mass noted          Assessment and Plan          Prediabetes  - HEMOGLOBIN A1C; Future  - Basic Metabolic Panel; Future  Recommend diet and exercise.    Liver enzyme elevation  - HEPATIC FUNCTION PANEL; Future  - US-RUQ; Future  - HEP B SURFACE AB; Future  - HEP B SURFACE ANTIGEN; Future  - HEP C VIRUS ANTIBODY; Future  Repeat lab test and ultrasound requested.  " Follow-up after these are done    Anxiety  Shared decision making  Increase the citalopram from 20 Mg daily to 30 mg Mg daily.  Recommend follow-up in 6 to 8 weeks.  Advised the patient to contact and follow-up with behavioral health specialist.       Pipe smoker  Advised the patient is important to quit  I spent 4 minutes of face to face counseling pt regarding smoking cessation.   Discussed w pt the potential risks/hazards of tobacco.    Strongly advised pt to quit.  Continue to work on reducing. Attempt 50% reduction every 2-3 weeks.     Other orders  - Citalopram Hydrobromide 30 MG Cap; Take 30 mg by mouth every day.  Dispense: 30 Capsule; Refill: 5                      Healthcare Maintenance     Health Maintenance Due   Topic Date Due    COVID-19 Vaccine (1) Never done    IMM PNEUMOCOCCAL VACCINE: 0-64 Years (1 - PCV) Never done    IMM DTaP/Tdap/Td Vaccine (3 - Tdap) 12/21/2012               Please note that this dictation was created using voice recognition software. I have made every reasonable attempt to correct obvious errors, but I expect that there are errors of grammar and possibly content that I did not discover before finalizing the note.    Jonathan Mckinnon MD  Internal Medicine  [facility]

## 2022-08-25 NOTE — TELEPHONE ENCOUNTER
VOICEMAIL  1. Caller Name: [facility] pharmacy                      Call Back Number:       2. Message: Patient's insurance does not cover the 30mg. Please provide an alternative. Or the pharmacy states you can order 20mg and 10mg and have patient take both to make 30mg. Please advise. Or send a new script for both     3. Patient approves office to leave a detailed voicemail/[employer] message: N\A

## 2022-08-25 NOTE — ASSESSMENT & PLAN NOTE
Chart review showed the liver enzymes mildly elevated.  Patient does not drink alcohol.  I am requesting the liver panel to be repeated along with hepatitis serology.  Liver ultrasound also ordered.

## 2022-08-25 NOTE — ASSESSMENT & PLAN NOTE
This is a chronic condition.  The patient is presently taking citalopram 20 Mg daily.  The patient reported some improvement since taking the medication however he still noted episode of anxiety.  Patient denies significant side effects.  The patient wishes to try a higher dose.

## 2022-08-26 ENCOUNTER — HOSPITAL ENCOUNTER (OUTPATIENT)
Dept: LAB | Facility: MEDICAL CENTER | Age: 27
End: 2022-08-26
Attending: INTERNAL MEDICINE
Payer: COMMERCIAL

## 2022-08-26 DIAGNOSIS — R73.03 PREDIABETES: ICD-10-CM

## 2022-08-26 DIAGNOSIS — Z00.00 WELL ADULT EXAM: ICD-10-CM

## 2022-08-26 DIAGNOSIS — R74.8 LIVER ENZYME ELEVATION: ICD-10-CM

## 2022-08-26 LAB
25(OH)D3 SERPL-MCNC: 22 NG/ML (ref 30–100)
ALBUMIN SERPL BCP-MCNC: 5 G/DL (ref 3.2–4.9)
ALP SERPL-CCNC: 58 U/L (ref 30–99)
ALT SERPL-CCNC: 20 U/L (ref 2–50)
ANION GAP SERPL CALC-SCNC: 10 MMOL/L (ref 7–16)
AST SERPL-CCNC: 17 U/L (ref 12–45)
BILIRUB CONJ SERPL-MCNC: <0.2 MG/DL (ref 0.1–0.5)
BILIRUB INDIRECT SERPL-MCNC: ABNORMAL MG/DL (ref 0–1)
BILIRUB SERPL-MCNC: 0.5 MG/DL (ref 0.1–1.5)
BUN SERPL-MCNC: 13 MG/DL (ref 8–22)
CALCIUM SERPL-MCNC: 9.3 MG/DL (ref 8.5–10.5)
CHLORIDE SERPL-SCNC: 102 MMOL/L (ref 96–112)
CHOLEST SERPL-MCNC: 177 MG/DL (ref 100–199)
CO2 SERPL-SCNC: 26 MMOL/L (ref 20–33)
CREAT SERPL-MCNC: 0.9 MG/DL (ref 0.5–1.4)
CREAT UR-MCNC: 124.91 MG/DL
ERYTHROCYTE [DISTWIDTH] IN BLOOD BY AUTOMATED COUNT: 44 FL (ref 35.9–50)
EST. AVERAGE GLUCOSE BLD GHB EST-MCNC: 123 MG/DL
GFR SERPLBLD CREATININE-BSD FMLA CKD-EPI: 119 ML/MIN/1.73 M 2
GLUCOSE SERPL-MCNC: 89 MG/DL (ref 65–99)
HBA1C MFR BLD: 5.9 % (ref 4–5.6)
HBV SURFACE AB SERPL IA-ACNC: 555 MIU/ML (ref 0–10)
HBV SURFACE AG SER QL: NORMAL
HCT VFR BLD AUTO: 48.5 % (ref 42–52)
HCV AB SER QL: NORMAL
HDLC SERPL-MCNC: 74 MG/DL
HGB BLD-MCNC: 15.7 G/DL (ref 14–18)
LDLC SERPL CALC-MCNC: 85 MG/DL
MCH RBC QN AUTO: 29.8 PG (ref 27–33)
MCHC RBC AUTO-ENTMCNC: 32.4 G/DL (ref 33.7–35.3)
MCV RBC AUTO: 92.2 FL (ref 81.4–97.8)
MICROALBUMIN UR-MCNC: <1.2 MG/DL
MICROALBUMIN/CREAT UR: NORMAL MG/G (ref 0–30)
PLATELET # BLD AUTO: 223 K/UL (ref 164–446)
PMV BLD AUTO: 11.3 FL (ref 9–12.9)
POTASSIUM SERPL-SCNC: 4.6 MMOL/L (ref 3.6–5.5)
PROT SERPL-MCNC: 7.9 G/DL (ref 6–8.2)
RBC # BLD AUTO: 5.26 M/UL (ref 4.7–6.1)
SODIUM SERPL-SCNC: 138 MMOL/L (ref 135–145)
TRIGL SERPL-MCNC: 91 MG/DL (ref 0–149)
TSH SERPL DL<=0.005 MIU/L-ACNC: 1.48 UIU/ML (ref 0.38–5.33)
WBC # BLD AUTO: 3.4 K/UL (ref 4.8–10.8)

## 2022-08-26 PROCEDURE — 36415 COLL VENOUS BLD VENIPUNCTURE: CPT

## 2022-08-26 PROCEDURE — 82043 UR ALBUMIN QUANTITATIVE: CPT

## 2022-08-26 PROCEDURE — 85027 COMPLETE CBC AUTOMATED: CPT

## 2022-08-26 PROCEDURE — 82570 ASSAY OF URINE CREATININE: CPT

## 2022-08-26 PROCEDURE — 84443 ASSAY THYROID STIM HORMONE: CPT

## 2022-08-26 PROCEDURE — 86706 HEP B SURFACE ANTIBODY: CPT

## 2022-08-26 PROCEDURE — 82306 VITAMIN D 25 HYDROXY: CPT

## 2022-08-26 PROCEDURE — 87340 HEPATITIS B SURFACE AG IA: CPT

## 2022-08-26 PROCEDURE — 80076 HEPATIC FUNCTION PANEL: CPT

## 2022-08-26 PROCEDURE — 86803 HEPATITIS C AB TEST: CPT

## 2022-08-26 PROCEDURE — 80061 LIPID PANEL: CPT

## 2022-08-26 PROCEDURE — 83036 HEMOGLOBIN GLYCOSYLATED A1C: CPT

## 2022-08-26 PROCEDURE — 80048 BASIC METABOLIC PNL TOTAL CA: CPT

## 2022-09-01 ENCOUNTER — HOSPITAL ENCOUNTER (OUTPATIENT)
Dept: RADIOLOGY | Facility: MEDICAL CENTER | Age: 27
End: 2022-09-01
Attending: INTERNAL MEDICINE
Payer: COMMERCIAL

## 2022-09-01 DIAGNOSIS — R74.8 LIVER ENZYME ELEVATION: ICD-10-CM

## 2022-09-01 PROCEDURE — 76705 ECHO EXAM OF ABDOMEN: CPT

## 2022-09-07 ENCOUNTER — TELEPHONE (OUTPATIENT)
Dept: MEDICAL GROUP | Facility: PHYSICIAN GROUP | Age: 27
End: 2022-09-07

## 2022-09-07 NOTE — TELEPHONE ENCOUNTER
FINAL PRIOR AUTHORIZATION STATUS:    1.  Name of Medication & Dose: Citalopram 30mg     2. Prior Auth Status: Denied.  Reason: Patient needs to try and fail formulary alternatives prior to getting this one approved    3. Action Taken: Pharmacy Notified: yes Patient Notified: yes      Please advise if you would like to prescribe an alternative.

## 2022-09-08 RX ORDER — PAROXETINE HYDROCHLORIDE 20 MG/1
20 TABLET, FILM COATED ORAL DAILY
Qty: 30 TABLET | Refills: 4 | Status: SHIPPED | OUTPATIENT
Start: 2022-09-08 | End: 2022-10-13 | Stop reason: SDUPTHER

## 2022-10-13 ENCOUNTER — OFFICE VISIT (OUTPATIENT)
Dept: MEDICAL GROUP | Facility: PHYSICIAN GROUP | Age: 27
End: 2022-10-13
Payer: COMMERCIAL

## 2022-10-13 VITALS
HEART RATE: 87 BPM | OXYGEN SATURATION: 98 % | WEIGHT: 128 LBS | RESPIRATION RATE: 16 BRPM | DIASTOLIC BLOOD PRESSURE: 52 MMHG | BODY MASS INDEX: 18.96 KG/M2 | SYSTOLIC BLOOD PRESSURE: 94 MMHG | HEIGHT: 69 IN | TEMPERATURE: 97.8 F

## 2022-10-13 DIAGNOSIS — R74.8 LIVER ENZYME ELEVATION: ICD-10-CM

## 2022-10-13 DIAGNOSIS — F41.9 ANXIETY: ICD-10-CM

## 2022-10-13 DIAGNOSIS — F17.290 PIPE SMOKER: ICD-10-CM

## 2022-10-13 DIAGNOSIS — Z23 NEED FOR VACCINATION: ICD-10-CM

## 2022-10-13 PROCEDURE — 90715 TDAP VACCINE 7 YRS/> IM: CPT | Performed by: INTERNAL MEDICINE

## 2022-10-13 PROCEDURE — 90471 IMMUNIZATION ADMIN: CPT | Performed by: INTERNAL MEDICINE

## 2022-10-13 PROCEDURE — 99214 OFFICE O/P EST MOD 30 MIN: CPT | Mod: 25 | Performed by: INTERNAL MEDICINE

## 2022-10-13 RX ORDER — PAROXETINE HYDROCHLORIDE 20 MG/1
20 TABLET, FILM COATED ORAL DAILY
Qty: 30 TABLET | Refills: 4 | Status: SHIPPED | OUTPATIENT
Start: 2022-10-13 | End: 2022-10-13

## 2022-10-13 RX ORDER — PAROXETINE 10 MG/1
10 TABLET, FILM COATED ORAL DAILY
Qty: 30 TABLET | Refills: 6 | Status: SHIPPED | OUTPATIENT
Start: 2022-10-13 | End: 2022-10-13

## 2022-10-13 RX ORDER — PAROXETINE HYDROCHLORIDE 20 MG/1
20 TABLET, FILM COATED ORAL DAILY
Qty: 30 TABLET | Refills: 4 | Status: SHIPPED
Start: 2022-10-13 | End: 2023-05-11 | Stop reason: SDUPTHER

## 2022-10-13 ASSESSMENT — FIBROSIS 4 INDEX: FIB4 SCORE: 0.48

## 2022-10-13 NOTE — ASSESSMENT & PLAN NOTE
I spent 5 minutes of face to face counseling pt regarding tobacco cessation.   Discussed w pt the potential risks/hazards of tobacco.    Strongly advised pt to quit.  Continue to work on reducing. Attempt 50% reduction every 2-3 weeks.

## 2022-10-13 NOTE — PROGRESS NOTES
PRIMARY CARE CLINIC VISIT  Chief Complaint   Patient presents with    Follow-Up     F.u anxiety  Ultrasound result  Requests Tdap vaccine  History of Present Illness     Anxiety  This is a chronic condition.  Patient was taking citalopram 20 mg previously however the medication was not effective.  During the last visit I suggested for the patient to increase the dose to citalopram 30 mg once a day however this medication was not covered by the insurance.    Therefore today I recommend for the patient to consider a new medication paroxetine 20 mg daily.  Potential side effect of medication discussed with the patient.  Patient voiced understanding and wishes to give it a try.    Liver enzyme elevation  Recent blood test showed the liver enzymes ALT and AST.  And now back in the normal range     the result discussed with the patient.    The liver ultrasound also discussed with patient  US-RUQ  Narrative: 9/1/2022 9:20 AM    HISTORY/REASON FOR EXAM:  Abnormal Labs  Abdominal pain    TECHNIQUE/EXAM DESCRIPTION AND NUMBER OF VIEWS:  Real-time sonography of the liver and biliary tree.    COMPARISON: None    FINDINGS:  The liver is normal in contour. There is no evidence of solid mass lesion. The liver measures 15.93 cm.    The gallbladder is normal. There is no evidence of cholelithiasis.  The gallbladder wall thickness measures less than 3 mm. There is no pericholecystic fluid.  The common duct measures 0.29 cm.    The pancreas is obscured by bowel gas.  The visualized aorta is normal in caliber.    Intrahepatic IVC is patent.    The portal vein is patent with hepatopetal flow. The MPV measures 1.25 cm.    The right kidney measures 10.99 cm.    There is no ascites.  Impression: Right upper quadrant ultrasound within normal limits.      Pipe smoker  I spent 5 minutes of face to face counseling pt regarding tobacco cessation.   Discussed w pt the potential risks/hazards of tobacco.    Strongly advised pt to quit.  Continue  "to work on reducing. Attempt 50% reduction every 2-3 weeks.    No current outpatient medications on file prior to visit.     No current facility-administered medications on file prior to visit.        Allergies: Amoxicillin and Penicillins    ROS  As per HPI above. All other systems reviewed and negative.      Past Medical, Social, and Family history reviewed and updated in EPIC     Objective     BP (!) 94/52 (BP Location: Left arm, Patient Position: Sitting, BP Cuff Size: Adult)   Pulse 87   Temp 36.6 °C (97.8 °F) (Temporal)   Resp 16   Ht 1.753 m (5' 9\")   Wt 58.1 kg (128 lb)   SpO2 98%    Body mass index is 18.9 kg/m².    General: alert in no apparent distress.  Cardiovascular: regular rate and rhythm  Pulmonary: lungs : no wheezing   Gastrointestinal: BS present. No obvious mass noted          Assessment and Plan     1. Need for vaccination  - Tdap Vaccine =>8YO IM    2. Anxiety  As above the patient would like to try Paxil.  Potential side effect of medication discussed with the patient   Recommend follow-up proximately 6 to 8 weeks.      3. Liver enzyme elevation  Recent blood test showed the ALT and AST are in the normal range  Liver ultrasound also normal.    4. Pipe smoker  Advised the patient is important to discontinue.                        Please note that this dictation was created using voice recognition software. I have made every reasonable attempt to correct obvious errors, but I expect that there are errors of grammar and possibly content that I did not discover before finalizing the note.    Jonathan Mckinnon MD  Internal Medicine  [facility]  "

## 2022-10-13 NOTE — ASSESSMENT & PLAN NOTE
This is a chronic condition.  Patient was taking citalopram 20 mg previously however the medication was not effective.  During the last visit I suggested for the patient to increase the dose to citalopram 30 mg once a day however this medication was not covered by the insurance.    Therefore today I recommend for the patient to consider a new medication paroxetine 20 mg daily.  Potential side effect of medication discussed with the patient.  Patient voiced understanding and wishes to give it a try.

## 2022-10-13 NOTE — ASSESSMENT & PLAN NOTE
Recent blood test showed the liver enzymes ALT and AST.  And now back in the normal range     the result discussed with the patient.    The liver ultrasound also discussed with patient  US-RUQ  Narrative: 9/1/2022 9:20 AM    HISTORY/REASON FOR EXAM:  Abnormal Labs  Abdominal pain    TECHNIQUE/EXAM DESCRIPTION AND NUMBER OF VIEWS:  Real-time sonography of the liver and biliary tree.    COMPARISON: None    FINDINGS:  The liver is normal in contour. There is no evidence of solid mass lesion. The liver measures 15.93 cm.    The gallbladder is normal. There is no evidence of cholelithiasis.  The gallbladder wall thickness measures less than 3 mm. There is no pericholecystic fluid.  The common duct measures 0.29 cm.    The pancreas is obscured by bowel gas.  The visualized aorta is normal in caliber.    Intrahepatic IVC is patent.    The portal vein is patent with hepatopetal flow. The MPV measures 1.25 cm.    The right kidney measures 10.99 cm.    There is no ascites.  Impression: Right upper quadrant ultrasound within normal limits.

## 2022-11-12 ENCOUNTER — HOSPITAL ENCOUNTER (OUTPATIENT)
Dept: RADIOLOGY | Facility: MEDICAL CENTER | Age: 27
End: 2022-11-12
Attending: NURSE PRACTITIONER
Payer: COMMERCIAL

## 2022-11-12 ENCOUNTER — OFFICE VISIT (OUTPATIENT)
Dept: URGENT CARE | Facility: PHYSICIAN GROUP | Age: 27
End: 2022-11-12
Payer: COMMERCIAL

## 2022-11-12 VITALS
DIASTOLIC BLOOD PRESSURE: 84 MMHG | TEMPERATURE: 97.9 F | RESPIRATION RATE: 16 BRPM | BODY MASS INDEX: 18.99 KG/M2 | SYSTOLIC BLOOD PRESSURE: 110 MMHG | WEIGHT: 128.25 LBS | HEART RATE: 79 BPM | HEIGHT: 69 IN | OXYGEN SATURATION: 99 %

## 2022-11-12 DIAGNOSIS — S60.032A CONTUSION OF LEFT MIDDLE FINGER WITHOUT DAMAGE TO NAIL, INITIAL ENCOUNTER: ICD-10-CM

## 2022-11-12 DIAGNOSIS — S69.92XA INJURY OF FINGER OF LEFT HAND, INITIAL ENCOUNTER: ICD-10-CM

## 2022-11-12 PROCEDURE — 73140 X-RAY EXAM OF FINGER(S): CPT | Mod: LT

## 2022-11-12 PROCEDURE — 99213 OFFICE O/P EST LOW 20 MIN: CPT | Performed by: NURSE PRACTITIONER

## 2022-11-12 ASSESSMENT — ENCOUNTER SYMPTOMS
TINGLING: 0
BRUISES/BLEEDS EASILY: 0
SENSORY CHANGE: 0
FALLS: 0
FEVER: 0
CHILLS: 0
MYALGIAS: 1

## 2022-11-12 ASSESSMENT — FIBROSIS 4 INDEX: FIB4 SCORE: 0.48

## 2022-11-12 NOTE — PROGRESS NOTES
"Dominick Fox is a 26 y.o. male who presents with Hand Injury (Left 3rd digit injury X 1 day.)            Hand Injury  Associated symptoms include myalgias. Pertinent negatives include no chills, fever or rash. States was using nail gun when the wedge he was using slipped and his left middle finger was smashed between the wood and concrete.  This injury occurred yesterday.  Did take Tylenol for pain.  No ice application.  Decreased range of motion and bruising due to pain.  States pain is localized to tip of this finger.  All other fingers were not involved.    PMH:  has no past medical history on file.  MEDS:   Current Outpatient Medications:     PARoxetine (PAXIL) 20 MG Tab, Take 1 Tablet by mouth every day., Disp: 30 Tablet, Rfl: 4  ALLERGIES:   Allergies   Allergen Reactions    Amoxicillin Hives    Penicillins Hives     SURGHX: History reviewed. No pertinent surgical history.  SOCHX:  reports that he has been smoking pipe. He has never used smokeless tobacco. He reports current drug use. Drug: Cocaine. He reports that he does not drink alcohol.  FH: Family history was reviewed, no pertinent findings to report      Review of Systems   Constitutional:  Negative for chills, fever and malaise/fatigue.   Musculoskeletal:  Positive for joint pain and myalgias. Negative for falls.   Skin:  Negative for itching and rash.   Neurological:  Negative for tingling and sensory change.   Endo/Heme/Allergies:  Does not bruise/bleed easily.   All other systems reviewed and are negative.           Objective     /84 (BP Location: Left arm, Patient Position: Sitting, BP Cuff Size: Adult)   Pulse 79   Temp 36.6 °C (97.9 °F) (Temporal)   Resp 16   Ht 1.753 m (5' 9\")   Wt 58.2 kg (128 lb 4 oz)   SpO2 99%   BMI 18.94 kg/m²      Physical Exam  Vitals reviewed.   Constitutional:       General: He is awake. He is not in acute distress.     Appearance: Normal appearance. He is well-developed. He is " not ill-appearing, toxic-appearing or diaphoretic.   HENT:      Head: Normocephalic.   Eyes:      Pupils: Pupils are equal, round, and reactive to light.   Cardiovascular:      Rate and Rhythm: Normal rate.   Pulmonary:      Effort: Pulmonary effort is normal.   Musculoskeletal:      Left hand: Swelling, tenderness and bony tenderness present. No deformity or lacerations. Decreased range of motion. Decreased strength of finger abduction and thumb/finger opposition. Normal sensation. There is no disruption of two-point discrimination. Normal capillary refill. Normal pulse.      Comments: Med assist applied finger splint to left middle finger.   Skin:     General: Skin is warm and dry.      Findings: Bruising and signs of injury present. No abrasion, burn, erythema, laceration or wound.      Comments: Mild swelling at tip of left middle finger extending from her first DIP joint.  Mild bruising without deformity.  No open wounds.  Tenderness on palpation at this site.  Unable to close hand to make fist due to discomfort..   Neurological:      Mental Status: He is alert and oriented to person, place, and time.   Psychiatric:         Attention and Perception: Attention normal.         Mood and Affect: Mood normal.         Speech: Speech normal.         Behavior: Behavior normal. Behavior is cooperative.                           Assessment & Plan        1. Injury of finger of left hand, initial encounter    - DX-FINGER(S) 2+ LEFT; Future  2. Contusion of left middle finger without damage to nail, initial encounter       -May use over the counter NSAID as needed for pain/swelling  -May use cool compresses for swelling as needed  -May utilize RICE method as needed, applied finger splint, use until finger stabilized/pain improves  -Avoid excessive hand use to avoid further injury  -May apply topical analgesics as needed   -Perform proper body mechanics with lift/jin, push/pull  -Monitor for deformity, numbness/tingling in  fingers, decreased range of motion with lifting difficulty- need re-evaluation

## 2022-11-17 ENCOUNTER — APPOINTMENT (OUTPATIENT)
Dept: BEHAVIORAL HEALTH | Facility: CLINIC | Age: 27
End: 2022-11-17
Payer: COMMERCIAL

## 2022-11-17 NOTE — PROGRESS NOTES
[facility]   Initial Assessment    Name: Clovis Fox  MRN: [id number]  : 1995  Age: 26 y.o.  Date of assessment: 2022  PCP: Jonathan Mckinnon M.D.  Persons in attendance: {[facility] ATTENDEES:83962421}  Total session time: *** minutes    Therapy was provided on this date in coordination with the [facility] approved Clinical Supervisor under the direct supervision of Dr. Korey Perez who was on site during this visit.     Confidentiality and limits were reviewed, and patient expressed desire to continue.       CHIEF COMPLAINT AND HISTORY OF PRESENTING PROBLEM:  (as stated by {[facility] ATTENDEES:[id number]}):  Clovis Fox is a 26 y.o.,  male referred for assessment by Jonathan Mckinnon M.D..  Primary presenting issue includes No chief complaint on file.  . ***      Clovis was seen by Internal medicine on 2022, and the following excerpts are relevant to today's encounter:     History of Present Illness      Encounter to establish care with new doctor  Patient present today to establish care with new primary care provider.     Anxiety  Patient reported that this is a chronic recurrent condition has been noted for quite some time..  The patient reported recurrent anxiety especially when he is in crowded places or in small confined areas.  Patient reported rapid heartbeat, feeling tense...  Patient denies SI.     Past medical history anxiety     Surgical history none reported     Family history as in epic     Social history.  The patient is pipe smoker.  Advised the patient is important to discontinue           No current outpatient medications on file prior to visit.      No current facility-administered medications on file prior to visit.         Allergies: Amoxicillin and Penicillins     ROS  As per HPI above. All other systems reviewed and negative.       Past Medical, Social, and Family history reviewed and updated in EPIC      Objective      BP (!) 96/62 (BP  "Location: Right arm, Patient Position: Sitting, BP Cuff Size: Adult)   Pulse 70   Temp 36.3 °C (97.4 °F) (Temporal)   Resp 16   Ht 1.753 m (5' 9\")   Wt 57.6 kg (127 lb)   SpO2 99%    Body mass index is 18.75 kg/m².     General: alert and oriented  Cardiovascular: regular rate and rhythm  Pulmonary: lungs : no wheezing   Gastrointestinal: BS present. No obvious mass noted  Neuro cranial nerves II to XII grossly intact nonfocal exam           Assessment and Plan      1. Encounter to establish care with new doctor     2. Anxiety  Chronic condition.  Uncontrolled.  Brief counseling offered in the office today.  - Referral to Behavioral Health  Other orders  - citalopram (CELEXA) 20 MG Tab; Take 1 Tablet by mouth every day.  Dispense: 30 Tablet; Refill: 5  Recommend follow-up approximately 4 weeks     3. Pipe smoker  I spent 5 minutes of face to face counseling pt regarding smoking cessation.   Discussed w pt the potential risks/hazards of tobacco.    Strongly advised pt to quit.  We discussed pharmacotherapy options for quiting including nicotine replacement.     4. Need for vaccination  - Hepatitis B Vaccine Adult 20+     5. Well adult exam  Routine lab work ordered.  Advised the patient to follow-up after blood test done  - HEMOGLOBIN A1C; Future  - Basic Metabolic Panel; Future  - CBC WITHOUT DIFFERENTIAL; Future  - Lipid Profile; Future  - TSH; Future  - MICROALBUMIN CREAT RATIO URINE; Future  - VITAMIN D,25 HYDROXY; Future    BEHAVIORAL HEALTH TREATMENT HISTORY  Does patient/parent report a history of prior behavioral health treatment for patient? { OUTPATIENT TREATMENT:16100473}  History of untreated behavioral health issues identified? {Yes/No/WC:583528}  Does patient/parent report change in appetite or weight loss/gain? {Yes/No/WC:133239}  Does patient/parent report physical pain? {Yes/No/WC:810345}              Indicate if pain is acute or chronic, and location: ***              Pain scale rating:     "       FAMILY/SOCIAL HISTORY  Current living situation/household members: ***  Does patient/parent report a family history of behavioral health issues, diagnoses, or treatment?   Family History   Problem Relation Age of Onset    Hypertension Mother     Cancer Father     Diabetes Paternal Grandmother           EMPLOYMENT/RESOURCES  Is the patient currently employed? {Yes/No/WC:869412}  Does the patient/parent report adequate financial resources? {Yes/No/WC:449912}       HISTORY:  Does patient report current or past enlistment? {Yes/No/WC:664898}               [If yes, complete below items]  Does patient report history of exposure to combat? {Yes/No/WC:146270}      SPIRITUAL/CULTURAL/IDENTITY:  What are the patient's/family's spiritual beliefs or practices? ***      ABUSE/NEGLECT/TRAUMA SCREENING  Does patient report feeling “unsafe” in his/her home, or afraid of anyone? {Yes/No/WC:213080}  Does patient report any history of physical, sexual, or emotional abuse? {Yes/No/WC:350376}  Is there evidence of neglect by self? {Yes/No/WC:631677}  Is there evidence of neglect by a caregiver? {Yes/No/WC:546460}                                                                                                          SAFETY ASSESSMENT - SELF  Does patient acknowledge current or past symptoms of dangerousness to self? {Yes/No/WC:858693}  Recent change in frequency/specificity/intensity of suicidal thoughts or self-harm behavior? {Yes/No/WC:431096}  Current access to firearms, medications, or other identified means of suicide/self-harm? {Yes/No/WC:411818}  If yes, willing to restrict access to means of suicide/self-harm? {Yes/No/WC:394290}      Current Suicide Risk: {[facility] RATINGS:99846293}  Crisis Safety Plan completed and copy given to patient: {Yes/No/WC:609512}      SAFETY ASSESSMENT - OTHERS  Recent change in frequency/specificity/intensity of thoughts or threats to harm others? {Yes/No/WC:691840}  If Yes:  Current access  to firearms/other identified means of harm?   If yes, willing to restrict access to weapons/means of harm?     Current Homicide Risk:  {[facility] RATINGS:56352173}  Crisis Safety Plan completed and copy given to patient? {Yes/No/WC:114592}  Based on information provided during the current assessment, is a mandated “duty to warn” being exercised? { AUTHORITY NOTIFIED:09801000}      SUBSTANCE USE/ADDICTION HISTORY  Patient denies use of any substance/addictive behaviors {Yes/No/WC:026953}    If No:  Is there a family history of substance use/addiction? {Yes/No/WC:502056}  Does patient acknowledge or parent/significant other report use of/dependence on substances? {Yes/No/WC:191043}  Last time patient used alcohol: ***  Within the past week? {Yes/No/WC:939126}  Last time patient used marijuana: ***  Within the past month? {Yes/No/WC:642567}  Any other street drugs ever tried even once? {Yes/No/WC:795381}  Any use of prescription medications/pills without a prescription, or for reasons others than originally prescribed?  {Yes/No/WC:092793}  Any other addictive behavior reported (gambling, shopping, sex)? {Yes/No/WC:194552}     Drug History:  Amphetamine:      Cannibis:      Cocaine:      Ecstasy:      Hallucinogen:      Inhalant:       Opiate:      Other:      Sedative:           MENTAL STATUS/OBSERVATIONS              Participation: {[facility] PARTICIPATION MEASURES:78930692}  Grooming: {[facility] GROOMIN}  Orientation:{[facility] ORIENTATION:54812123}   Behavior: {[facility] BEHAVIOR:85054533}  Eye contact: {[facility] EYE CONTACT:63462010}          Mood:{[facility] MOOD:14648639}  Affect:{[facility] AFFECT:61849434}  Thought process: {[facility] THOUGHT PROCESS:59321540}  Thought content:  {[facility] THOUGHT CONTENT:07655376}  Speech: {[facility] SPEECH:31337951}  Perception: {[facility] PERCEPTION:37022435}  Memory: {[facility] MEMORY:41784798}  Insight: {GOOD/ADEQUATE/LIMITED/POOR:30279972}  Judgment:  {GOOD/ADEQUATE/LIMITED/POOR:21944311}  Other:                Family/couple interaction observations: ***      Patient's motivation/readiness for change: ***    Topics addressed in psychotherapy include: ***    Care plan completed: {Yes/No/WC:042970}  Does patient express agreement with the above plan? {Yes/No/WC:879902}     Diagnosis:  1. VALERIA (generalized anxiety disorder)        Referral appointment(s) scheduled? {Yes/No/WC:320483}       Daquan Villavicencio PsyD  Clinical Psychologist Assistant   [place] #

## 2022-11-18 ENCOUNTER — OFFICE VISIT (OUTPATIENT)
Dept: BEHAVIORAL HEALTH | Facility: CLINIC | Age: 27
End: 2022-11-18
Payer: COMMERCIAL

## 2022-11-18 DIAGNOSIS — F41.1 GAD (GENERALIZED ANXIETY DISORDER): ICD-10-CM

## 2022-11-18 PROCEDURE — 90791 PSYCH DIAGNOSTIC EVALUATION: CPT | Performed by: PSYCHOLOGIST

## 2022-11-18 ASSESSMENT — ANXIETY QUESTIONNAIRES
2. NOT BEING ABLE TO STOP OR CONTROL WORRYING: SEVERAL DAYS
7. FEELING AFRAID AS IF SOMETHING AWFUL MIGHT HAPPEN: MORE THAN HALF THE DAYS
GAD7 TOTAL SCORE: 12
1. FEELING NERVOUS, ANXIOUS, OR ON EDGE: MORE THAN HALF THE DAYS
6. BECOMING EASILY ANNOYED OR IRRITABLE: MORE THAN HALF THE DAYS
5. BEING SO RESTLESS THAT IT IS HARD TO SIT STILL: MORE THAN HALF THE DAYS
IF YOU CHECKED OFF ANY PROBLEMS ON THIS QUESTIONNAIRE, HOW DIFFICULT HAVE THESE PROBLEMS MADE IT FOR YOU TO DO YOUR WORK, TAKE CARE OF THINGS AT HOME, OR GET ALONG WITH OTHER PEOPLE: SOMEWHAT DIFFICULT
3. WORRYING TOO MUCH ABOUT DIFFERENT THINGS: SEVERAL DAYS
4. TROUBLE RELAXING: MORE THAN HALF THE DAYS

## 2022-11-18 ASSESSMENT — PATIENT HEALTH QUESTIONNAIRE - PHQ9
SUM OF ALL RESPONSES TO PHQ QUESTIONS 1-9: 13
CLINICAL INTERPRETATION OF PHQ2 SCORE: 3
5. POOR APPETITE OR OVEREATING: 0 - NOT AT ALL
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
## 2022-12-02 ENCOUNTER — OFFICE VISIT (OUTPATIENT)
Dept: BEHAVIORAL HEALTH | Facility: CLINIC | Age: 27
End: 2022-12-02
Payer: COMMERCIAL

## 2022-12-02 DIAGNOSIS — F41.1 GAD (GENERALIZED ANXIETY DISORDER): ICD-10-CM

## 2022-12-02 PROCEDURE — 99999 PR NO CHARGE: CPT | Performed by: PSYCHIATRY & NEUROLOGY
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
## 2022-12-09 ENCOUNTER — TELEMEDICINE (OUTPATIENT)
Dept: BEHAVIORAL HEALTH | Facility: CLINIC | Age: 27
End: 2022-12-09
Payer: COMMERCIAL

## 2022-12-09 DIAGNOSIS — F41.1 GAD (GENERALIZED ANXIETY DISORDER): ICD-10-CM

## 2022-12-09 PROCEDURE — 90834 PSYTX W PT 45 MINUTES: CPT | Mod: 95 | Performed by: PSYCHIATRY & NEUROLOGY
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
## 2022-12-16 ENCOUNTER — OFFICE VISIT (OUTPATIENT)
Dept: BEHAVIORAL HEALTH | Facility: CLINIC | Age: 27
End: 2022-12-16
Payer: COMMERCIAL

## 2022-12-16 DIAGNOSIS — F41.1 GAD (GENERALIZED ANXIETY DISORDER): ICD-10-CM

## 2022-12-16 PROCEDURE — 90834 PSYTX W PT 45 MINUTES: CPT | Performed by: PSYCHIATRY & NEUROLOGY
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
## 2023-02-01 NOTE — PROGRESS NOTES
[facility]   Therapy Progress Note        Name: Clovis Fox  MRN: [id number]  : 1995  Age: 27 y.o.  Date of assessment: 2023  PCP: Jonathan Mckinnon M.D.  Persons in attendance: Patient  Total session time: 50 minutes    Therapy was provided on this date in coordination with the [facility] approved Clinical Supervisor under the direct supervision of Dr. Werner Garcia who was on site during this visit.      Topics addressed in psychotherapy include:     Darrell stated that he was doing well today, and his affect was congruent.     He stated that he has kicked his habit of doing drugs and is now five weeks sober. The therapist commended him for this, and also explored what the impetus behind this decision was. He stated that he wanted to allow himself to feel his emotions. He noted that his energy is still leveling out, which has been something to get used to.    He stated that there has been a lot of drama at work, which almost caused him to use again, however he was able to put his headphones in and focus on himself.    He has been dreaming about his ex a lot lately, and also had a dream aboutbeing incarcerated again. The therapist and Darrell explored why these dreams may be coming up for him, and he and the therapist went through the criteria of PTSD to see if Darrell meets criteria. He endorsed exposure and witnessing traumatic events, intrusive symptoms, avoidance symptoms, negative alterations in cognitions and mood, as well as marked alterations in arousal and reactivity. Darrell and the therapist decided that it would be important for him to process some of the trauma that he has been through, in order to allow him to feel that he can move forward intentionally in his life.    Objective Observations:   Participation:Active verbal participation, Attentive, Engaged, and Open to feedback   Grooming:Good and Casual   Cognition:Alert and Fully Oriented   Eye  Contact:Good   Mood:Euthymic   Affect:Flexible   Thought Process:Logical and Goal-directed   Speech:Rate within normal limits and Volume within normal limits    Current Risk:   Suicide: low   Homicide: low   Self-Harm: low   Relapse: low   Safety Plan Reviewed: not applicable    Care Plan Updated: No    Does patient express agreement with the above plan? Yes     Diagnosis:  1. VALERIA (generalized anxiety disorder)    PTSD    Referral appointment(s) scheduled? Yes       Daquan Villavicencio PsyD  Clinical Psychologist Assistant   [place] #

## 2023-02-02 ENCOUNTER — TELEMEDICINE (OUTPATIENT)
Dept: BEHAVIORAL HEALTH | Facility: CLINIC | Age: 28
End: 2023-02-02
Payer: COMMERCIAL

## 2023-02-02 DIAGNOSIS — F41.1 GAD (GENERALIZED ANXIETY DISORDER): ICD-10-CM

## 2023-02-02 DIAGNOSIS — F43.10 PTSD (POST-TRAUMATIC STRESS DISORDER): ICD-10-CM

## 2023-02-02 PROCEDURE — 90834 PSYTX W PT 45 MINUTES: CPT | Mod: 95 | Performed by: PSYCHIATRY & NEUROLOGY

## 2023-05-11 ENCOUNTER — OFFICE VISIT (OUTPATIENT)
Dept: MEDICAL GROUP | Facility: PHYSICIAN GROUP | Age: 28
End: 2023-05-11
Payer: COMMERCIAL

## 2023-05-11 ENCOUNTER — HOSPITAL ENCOUNTER (OUTPATIENT)
Dept: RADIOLOGY | Facility: MEDICAL CENTER | Age: 28
End: 2023-05-11
Attending: INTERNAL MEDICINE
Payer: COMMERCIAL

## 2023-05-11 VITALS
WEIGHT: 135 LBS | TEMPERATURE: 98.7 F | DIASTOLIC BLOOD PRESSURE: 68 MMHG | OXYGEN SATURATION: 97 % | RESPIRATION RATE: 20 BRPM | BODY MASS INDEX: 19.99 KG/M2 | HEART RATE: 90 BPM | HEIGHT: 69 IN | SYSTOLIC BLOOD PRESSURE: 110 MMHG

## 2023-05-11 DIAGNOSIS — G89.29 CHRONIC PAIN OF RIGHT KNEE: ICD-10-CM

## 2023-05-11 DIAGNOSIS — F41.9 ANXIETY: ICD-10-CM

## 2023-05-11 DIAGNOSIS — M25.561 CHRONIC PAIN OF RIGHT KNEE: ICD-10-CM

## 2023-05-11 DIAGNOSIS — R73.03 PREDIABETES: ICD-10-CM

## 2023-05-11 DIAGNOSIS — T78.40XA ALLERGY, INITIAL ENCOUNTER: ICD-10-CM

## 2023-05-11 DIAGNOSIS — F17.290 PIPE SMOKER: ICD-10-CM

## 2023-05-11 PROCEDURE — 99214 OFFICE O/P EST MOD 30 MIN: CPT | Mod: 25 | Performed by: INTERNAL MEDICINE

## 2023-05-11 PROCEDURE — 73564 X-RAY EXAM KNEE 4 OR MORE: CPT | Mod: RT

## 2023-05-11 PROCEDURE — 99406 BEHAV CHNG SMOKING 3-10 MIN: CPT | Performed by: INTERNAL MEDICINE

## 2023-05-11 PROCEDURE — 3074F SYST BP LT 130 MM HG: CPT | Performed by: INTERNAL MEDICINE

## 2023-05-11 PROCEDURE — 3078F DIAST BP <80 MM HG: CPT | Performed by: INTERNAL MEDICINE

## 2023-05-11 RX ORDER — PAROXETINE HYDROCHLORIDE 20 MG/1
20 TABLET, FILM COATED ORAL DAILY
Qty: 30 TABLET | Refills: 4 | Status: SHIPPED | OUTPATIENT
Start: 2023-05-11 | End: 2024-01-03

## 2023-05-11 ASSESSMENT — FIBROSIS 4 INDEX: FIB4 SCORE: 0.49

## 2023-05-11 ASSESSMENT — PATIENT HEALTH QUESTIONNAIRE - PHQ9: CLINICAL INTERPRETATION OF PHQ2 SCORE: 0

## 2023-05-11 NOTE — PROGRESS NOTES
PRIMARY CARE CLINIC VISIT    Chief complaint:    Chronic right knee pain  Anxiety  Prediabetes  Rx refills  Request lab tests  Allergies    History of Present Illness     Chronic pain of right knee  Chronic condition.  Patient reported recurrent right knee pain noted for more than 1 year.  Patient denies recent trauma or injury.  Patient reported that he is working as a  and he is constantly on his knees several hours a day at work.  Patient denies recent trauma or injury.      Anxiety  Chronic stable condition.  The patient is taking Paxil 20 Mg daily.  No significant side effect effects reported.  Patient requests Rx refill.  Patient denies SI.    Prediabetes  Chronic condition.  The patient currently on diet therapy.  Lab test ordered for follow-up    Pipe smoker  Chronic condition.     Attestation: I spent 5 minutes of face to face counseling pt regarding nicotine cessation.   Discussed w pt and counseling on harmful effects of nicotine.     Strongly advised pt to quit.      Allergies  This is a chronic condition.  The patient denies fever chills shortness of breath or wheezing.  Patient has been using Zyrtec with only minimal improvement.        No current outpatient medications on file prior to visit.     No current facility-administered medications on file prior to visit.        Allergies: Amoxicillin and Penicillins    Current Outpatient Medications Ordered in Epic   Medication Sig Dispense Refill    PARoxetine (PAXIL) 20 MG Tab Take 1 Tablet by mouth every day. 30 Tablet 4     No current Epic-ordered facility-administered medications on file.       History reviewed. No pertinent past medical history.    History reviewed. No pertinent surgical history.    Family History   Problem Relation Age of Onset    Hypertension Mother     Cancer Father     Diabetes Paternal Grandmother        Social History     Tobacco Use   Smoking Status Every Day    Types: Pipe   Smokeless Tobacco Never   Vaping Use     "Vaping Use: Never used       Social History     Substance and Sexual Activity   Alcohol Use Not Currently       Review of systems.  As per HPI above. All other systems reviewed and negative.      Past Medical, Social, and Family history reviewed and updated in EPIC     Objective     /68 (BP Location: Left arm, Patient Position: Sitting, BP Cuff Size: Adult)   Pulse 90   Temp 37.1 °C (98.7 °F) (Temporal)   Resp 20   Ht 1.753 m (5' 9\")   Wt 61.2 kg (135 lb)   SpO2 97%    Body mass index is 19.94 kg/m².    General: alert in no apparent distress.  Cardiovascular: regular rate and rhythm  Pulmonary: lungs : no wheezing   Gastrointestinal: BS present. No obvious mass noted  Knee : No signficant swelling redness or deformity.   ROM limited due to pain specifically w knee flexion/extension.    Nose with slight mucosal formation.  No purulent drainage noted  oroPharynx clear    Lab Results   Component Value Date/Time    HBA1C 5.9 (H) 08/26/2022 01:43 PM       Lab Results   Component Value Date/Time    WBC 3.4 (L) 08/26/2022 01:43 PM    HEMOGLOBIN 15.7 08/26/2022 01:43 PM    HEMATOCRIT 48.5 08/26/2022 01:43 PM    MCV 92.2 08/26/2022 01:43 PM    PLATELETCT 223 08/26/2022 01:43 PM         Lab Results   Component Value Date/Time    SODIUM 138 08/26/2022 01:43 PM    POTASSIUM 4.6 08/26/2022 01:43 PM    GLUCOSE 89 08/26/2022 01:43 PM    BUN 13 08/26/2022 01:43 PM    CREATININE 0.90 08/26/2022 01:43 PM       Lab Results   Component Value Date/Time    CHOLSTRLTOT 177 08/26/2022 01:43 PM    TRIGLYCERIDE 91 08/26/2022 01:43 PM    HDL 74 08/26/2022 01:43 PM    LDL 85 08/26/2022 01:43 PM       Lab Results   Component Value Date/Time    ALTSGPT 20 08/26/2022 01:43 PM             Assessment and Plan     1. Chronic pain of right knee  Rule out degenerative joint versus ligamentous injury versus others    - DX-KNEE COMPLETE 4+ RIGHT; Future  - Referral to Orthopedics  Advised the patient to try over-the-counter Tylenol as needed " for pain control.  Recommend to use protective knee pads while working      2. Prediabetes  - HEMOGLOBIN A1C; Future  - Lipid Profile; Future  - Basic Metabolic Panel; Future  Chronic condition.  Current status unclear.  Lab test ordered for follow-up.      3. Anxiety  Chronic stable condition per continue with Paxil 20 Mg daily.    4. Pipe smoker  Chronic condition.  She tolerated by the patient is important to quit smoking BiPAP completely.    5.  Allergic rhinitis.  Chronic condition.  Uncontrolled.  Advised the patient to continue using Zyrtec 10 mg daily.  Recommend the use of over-the-counter fluticasone nasal spray twice daily.  I also discussed with the patient other option including referral to allergy and/or Kenalog injection.  The patient declined allergy referral and Kenalog injection.      Attestation: I spent 5 minutes of face to face counseling pt regarding nicotine cessation.       Attestation: I spent:  31   min -  That includes time for chart review before the visit, the actual patient visit, and time spent on documentation in EMR after the visit.  Chart review/prep, review of other providers' records, imaging/lab review, face-to-face time for history/examination, ordering, prescribing,  review of results/meds/ treatment plan with patient, and care coordination.                             Please note that this dictation was created using voice recognition software. I have made every reasonable attempt to correct obvious errors, but I expect that there are errors of grammar and possibly content that I did not discover before finalizing the note.    Jonathan Mckinnon MD  Internal Medicine  [facility]

## 2023-05-11 NOTE — ASSESSMENT & PLAN NOTE
This is a chronic condition.  The patient denies fever chills shortness of breath or wheezing.  Patient has been using Zyrtec with only minimal improvement.

## 2023-05-11 NOTE — ASSESSMENT & PLAN NOTE
Chronic stable condition.  The patient is taking Paxil 20 Mg daily.  No significant side effect effects reported.  Patient requests Rx refill.  Patient denies SI.

## 2023-05-11 NOTE — ASSESSMENT & PLAN NOTE
Chronic condition.  Patient reported recurrent right knee pain noted for more than 1 year.  Patient denies recent trauma or injury.  Patient reported that he is working as a  and he is constantly on his knees several hours a day at work.  Patient denies recent trauma or injury.

## 2023-06-15 PROBLEM — M67.51 PLICA OF KNEE, RIGHT: Status: ACTIVE | Noted: 2023-06-15

## 2023-06-15 PROBLEM — M25.861 IMPINGEMENT SYNDROME INVOLVING PATELLAR FAT PAD OF RIGHT KNEE: Status: ACTIVE | Noted: 2023-06-15

## 2023-07-26 ENCOUNTER — OFFICE VISIT (OUTPATIENT)
Dept: URGENT CARE | Facility: PHYSICIAN GROUP | Age: 28
End: 2023-07-26
Payer: COMMERCIAL

## 2023-07-26 VITALS — HEIGHT: 69 IN | BODY MASS INDEX: 18.81 KG/M2 | RESPIRATION RATE: 14 BRPM | WEIGHT: 127 LBS

## 2023-07-26 ASSESSMENT — FIBROSIS 4 INDEX: FIB4 SCORE: 0.49

## 2023-10-01 ENCOUNTER — OCCUPATIONAL MEDICINE (OUTPATIENT)
Dept: URGENT CARE | Facility: PHYSICIAN GROUP | Age: 28
End: 2023-10-01
Payer: COMMERCIAL

## 2023-10-01 VITALS
BODY MASS INDEX: 19.7 KG/M2 | TEMPERATURE: 97.7 F | WEIGHT: 133 LBS | OXYGEN SATURATION: 100 % | RESPIRATION RATE: 16 BRPM | SYSTOLIC BLOOD PRESSURE: 108 MMHG | DIASTOLIC BLOOD PRESSURE: 72 MMHG | HEIGHT: 69 IN | HEART RATE: 70 BPM
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
## 2023-10-01 DIAGNOSIS — M70.80 REPETITIVE MOTION INJURY: ICD-10-CM
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
## 2023-10-01 DIAGNOSIS — M62.830 SPASM OF THORACIC BACK MUSCLE: ICD-10-CM

## 2023-10-01 PROCEDURE — 3074F SYST BP LT 130 MM HG: CPT | Performed by: NURSE PRACTITIONER

## 2023-10-01 PROCEDURE — 3078F DIAST BP <80 MM HG: CPT | Performed by: NURSE PRACTITIONER

## 2023-10-01 PROCEDURE — 99213 OFFICE O/P EST LOW 20 MIN: CPT | Mod: 29 | Performed by: NURSE PRACTITIONER

## 2023-10-01 RX ORDER — TIZANIDINE 4 MG/1
4 TABLET ORAL EVERY 6 HOURS PRN
Qty: 30 TABLET | Refills: 0 | Status: SHIPPED | OUTPATIENT
Start: 2023-10-01 | End: 2024-01-03

## 2023-10-01 ASSESSMENT — FIBROSIS 4 INDEX: FIB4 SCORE: 0.49

## 2023-10-01 NOTE — PROGRESS NOTES
"Subjective:   Clovis Fox is a 27 y.o. male who presents for Back Pain (Workers [employer], patient states back injury x 1 week )      HPI  DOI: 9/24: Patient reports that he was lifting up an approximate 40 pound door several times throughout his shift.  Reports that he had a few recurrences readded that up at the door and assisted him at which time he felt a left thoracic muscular strain/spasm.  Patient reported injury on same day, was able to see on care site provider on 9/25 and 9/26.  Patient utilized massage gun as well as KT tape without noticing relief of his symptoms, he referred him here to urgent care.  Denies numbness, tingling, weakness.  Does report tightening feeling with certain range of motion activities.    ROS  All other systems are negative except as documented above within HPI.    MEDS:   Current Outpatient Medications:     tizanidine (ZANAFLEX) 4 MG Tab, Take 1 Tablet by mouth every 6 hours as needed (muscle spams/pain)., Disp: 30 Tablet, Rfl: 0    ondansetron (ZOFRAN) 4 MG Tab tablet, Take 1 Tablet by mouth every four hours as needed for Nausea/Vomiting., Disp: 20 Tablet, Rfl: 0    PARoxetine (PAXIL) 20 MG Tab, Take 1 Tablet by mouth every day., Disp: 30 Tablet, Rfl: 4  ALLERGIES:   Allergies   Allergen Reactions    Amoxicillin Hives    Penicillins Hives       Patient's PMH, SocHx, SurgHx, FamHx, Drug allergies and medications were reviewed.     Objective:   /72 (BP Location: Left arm, Patient Position: Sitting, BP Cuff Size: Large adult)   Pulse 70   Temp 36.5 °C (97.7 °F)   Resp 16   Ht 1.753 m (5' 9\")   Wt 60.3 kg (133 lb)   SpO2 100%   BMI 19.64 kg/m²     Physical Exam  Vitals and nursing note reviewed.   Constitutional:       General: He is awake.      Appearance: Normal appearance. He is well-developed.   HENT:      Head: Normocephalic and atraumatic.      Right Ear: External ear normal.      Left Ear: External ear normal.      Nose: Nose normal.      " Mouth/Throat:      Lips: Pink.      Mouth: Mucous membranes are moist.      Pharynx: Oropharynx is clear.   Eyes:      General: Lids are normal.      Extraocular Movements: Extraocular movements intact.      Conjunctiva/sclera: Conjunctivae normal.   Cardiovascular:      Rate and Rhythm: Normal rate and regular rhythm.   Pulmonary:      Effort: Pulmonary effort is normal.   Musculoskeletal:         General: Normal range of motion.      Cervical back: Normal range of motion.        Back:       Comments: No visible palpable signs of deformity, left medial thoracic/scapular area tenderness to palpation as well as palpable muscle spasm.  No decreased range of motion, however patient does report tenderness/muscle tightening with abduction. Distal N/V intact.   Skin:     General: Skin is warm and dry.   Neurological:      Mental Status: He is alert and oriented to person, place, and time.   Psychiatric:         Mood and Affect: Mood normal.         Behavior: Behavior normal. Behavior is cooperative.         Thought Content: Thought content normal.         Judgment: Judgment normal.         Assessment/Plan:   Assessment    1. Spasm of thoracic back muscle  - tizanidine (ZANAFLEX) 4 MG Tab; Take 1 Tablet by mouth every 6 hours as needed (muscle spams/pain).  Dispense: 30 Tablet; Refill: 0    2. Repetitive motion injury    3. Work related injury      See D39.   Trial over-the-counter lidocaine patches, Voltaren, as well as Rx tizanidine.  Work restrictions as per report.  Patient would like to work 12-hour shifts at this time x2 days.   Follow-up in 5 days, sooner if needed.

## 2023-10-01 NOTE — LETTER
[facility]  [address]  [place] [address]  Phone:  [phone] - Fax:  [phone]   [facility] Progress Report and Disability Certification  Date of Service: 10/1/2023   No Show:  No  Date / Time of Next Visit: 10/7/2023   Claim Information   Patient Name: Clovis Fox  Claim Number:     Employer: [employer]  Date of Injury: 9/26/2023     Insurer / TPA: [employer]  ID / SSN:     Occupation:   Diagnosis: Diagnoses of Spasm of thoracic back muscle, Repetitive motion injury, and Work related injury were pertinent to this visit.    Medical Information   Related to Industrial Injury? Yes    Subjective Complaints:  DOI: 9/24: Patient reports that he was lifting up an approximate 40 pound door several times throughout his shift.  Reports that he had a few recurrences readded that up at the door and assisted him at which time he felt a left thoracic muscular strain/spasm.  Patient reported injury on same day, was able to see on care site provider on 9/25 and 9/26.  Patient utilized massage gun as well as KT tape without noticing relief of his symptoms, he referred him here to urgent care.  Denies numbness, tingling, weakness.  Does report tightening feeling with certain range of motion activities.   Objective Findings: No visible palpable signs of deformity, left medial thoracic/scapular area tenderness to palpation as well as palpable muscle spasm.  No decreased range of motion, however patient does report tenderness/muscle tightening with abduction. Distal N/V intact.   Pre-Existing Condition(s):     Assessment:   Initial Visit    Status: Additional Care Required  Permanent Disability:No    Plan:      Diagnostics:      Comments:       Disability Information   Status: Released to Restricted Duty    From:  10/1/2023  Through: 10/7/2023 Restrictions are: Temporary   Physical Restrictions   Sitting:    Standing:    Stooping:     Bending:      Squatting:    Walking:    Climbing:    Pushing:      Pulling:    Other:    Reaching Above Shoulder (L):   Reaching Above Shoulder (R):       Reaching Below Shoulder (L):    Reaching Below Shoulder (R):      Not to exceed Weight Limits   Carrying(hrs):   Weight Limit(lb): < or = to 10 pounds Lifting(hrs):   Weight  Limit(lb): < or = to 10 pounds   Comments: Patient will continue working full-time, however limited by restrictions to 10 pounds.  Trial over-the-counter lidocaine patches as well as Rx tizanidine.  Recommend gentle range of motion/stretching exercises as tolerated.    Repetitive Actions   Hands: i.e. Fine Manipulations from Grasping:     Feet: i.e. Operating Foot Controls:     Driving / Operate Machinery:     Health Care Provider’s Original or Electronic Signature  [employer] Ayoub. Health Care Provider’s Original or Electronic Signature    Jt Garcia DO MPH     Clinic Name / Location: [address] [address] Clinic Phone Number: Dept: [phone]   Appointment Time: 2:55 Pm Visit Start Time: 3:14 PM   Check-In Time:  3:11 Pm Visit Discharge Time:  04:01 PM   Original-Treating Physician or Chiropractor    Page 2-Insurer/TPA    Page 3-Employer    Page 4-Employee

## 2023-10-01 NOTE — LETTER
"EMPLOYEE’S CLAIM FOR COMPENSATION/ REPORT OF INITIAL TREATMENT  FORM C-4  PLEASE TYPE OR PRINT    EMPLOYEE’S CLAIM - PROVIDE ALL INFORMATION REQUESTED   First Name  Clovis Last Name  Dominique Birthdate                    1995                Sex  male Claim Number (Insurer’s Use Only)   Home Address  [address] Age  27 y.o. Height  1.753 m (5' 9\") Weight  60.3 kg (133 lb) [facility]     [facility] Zip  [address] Telephone  [phone] (home)    Mailing Address  [address] [facility] Zip  [address] Primary Language Spoken  English    INSURER  [facility]     [employer]   Employee's Occupation (Job Title) When Injury or Occupational Disease Occurred      Employer's Name/Company Name  [employer]  Telephone  [phone]    Office Mail Address (Number and Street)  [address]        Date of Injury  9/26/2023               Hours Injury  3:00 PM Date Employer Notified  9/29/2023 Last Day of Work after Injury or Occupational Disease  10/1/2023 Supervisor to Whom Injury     Reported  [place]   Address or Location of Accident (if applicable)  Work [1]   What were you doing at the time of accident? (if applicable)  Lifting Machine door    How did this injury or occupational disease occur? (Be specific and answer in detail. Use additional sheet if necessary)  continous lifting thoughout shift of my machineo door   If you believe that you have an occupational disease, when did you first have knowledge of the disability and its relationship to your employment?   Witnesses to the Accident (if applicable)        Nature of Injury or Occupational Disease  Workers' Compensation  Part(s) of Body Injured or Affected  Lower Back Area (Lumbar Area & Lumbo-Sacral), Upper Back Area (Thoracic Area), N/A    I CERTIFY THAT THE ABOVE IS TRUE AND CORRECT TO T HE BEST OF MY " KNOWLEDGE AND THAT I HAVE PROVIDED THIS INFORMATION IN ORDER TO OBTAIN THE BENEFITS OF [place], OR [place] OF [place]).  I HEREBY AUTHORIZE ANY PHYSICIAN, CHIROPRACTOR, SURGEON, PRACTITIONER OR ANY OTHER PERSON, ANY HOSPITAL, INCLUDING [facility] OR [facility], ANY  MEDICAL SERVICE ORGANIZATION, ANY INSURANCE COMPANY, OR OTHER INSTITUTION OR ORGANIZATION TO RELEASE TO EACH OTHER, ANY MEDICAL OR OTHER INFORMATION, INCLUDING BENEFITS PAID OR PAYABLE, PERTINENT TO THIS INJURY OR DISEASE, EXCEPT INFORMATION RELATIVE TO DIAGNOSIS, TREATMENT AND/OR COUNSELING FOR AIDS, PSYCHOLOGICAL CONDITIONS, ALCOHOL OR CONTROLLED SUBSTANCES, FOR WHICH I MUST GIVE SPECIFIC AUTHORIZATION.  A PHOTOSTAT OF THIS AUTHORIZATION SHALL BE VALID AS THE ORIGINAL.       Date   Place Employee’s Original or  *Electronic Signature   THIS REPORT MUST BE COMPLETED AND MAILED WITHIN 3 WORKING DAYS OF TREATMENT   Place  [facility]  Name of Facility  [place]   Date  10/1/2023 Diagnosis and Description of Injury or Occupational Disease  (M62.830) Spasm of thoracic back muscle  (M70.80,  X50.3XXA) Repetitive motion injury  (Y99.0) Work related injury Is there evidence that the injured employee was under the influence of alcohol and/or another controlled substance at the time of accident?  ? No ? Yes (if yes, please explain)   Hour  3:14 PM   Diagnoses of Spasm of thoracic back muscle, Repetitive motion injury, and Work related injury were pertinent to this visit. No   Treatment  RICE, topical lidocaine patch Rx tizanidine, gentle stretching and range of motion as tolerated.  Have you advised the patient to remain off work five days or     more?    X-Ray Findings      ? Yes Indicate dates:   From   To      From information given by the employee, together with medical evidence, can        you directly connect this injury or occupational disease  "as job incurred?  Yes ? No If no, is the injured employee capable of:  ? full duty  No ? modified duty  Yes   Is additional medical care by a physician indicated?  Yes If modified duty, specify any limitations / restrictions  See D39   Do you know of any previous injury or disease contributing to this condition or occupational disease?  ? Yes ? No (Explain if yes)                          No   Date  10/1/2023 Print Health Care Provider's  Name  JEFE Ayoub I certify that the employer’s copy of  this form was delivered to the employer on:   Address  [address] Insurer’s Use Only     [facility] Zip  [address]    Provider’s Tax ID Number  [id number] Telephone  Dept: [phone]             Health Care Provider’s Original or Electronic Signature  e-CHIP Chung Degree (MD,DO, DC,PATaiwoC,APRN)  APRN      * Complete and attach Release of Information (Form C-4A) when injured employee signs C-4 Form electronically  ORIGINAL - TREATING HEALTHCARE PROVIDER PAGE 2 - INSURER/TPA PAGE 3 - EMPLOYER PAGE 4 - EMPLOYEE             Form C-4 (rev.08/21)           BRIEF DESCRIPTION OF RIGHTS AND BENEFITS  (Pursuant to [place] 616C.050)    Notice of Injury or Occupational Disease (Incident Report Form C-1): If an injury or occupational disease (OD) arises out of and in the course of employment, you must provide written notice to your employer as soon as practicable, but no later than 7 days after the accident or OD. Your employer shall maintain a sufficient supply of the required forms.    Claim for Compensation (Form C-4): If medical treatment is sought, the form C-4 is available at the place of initial treatment. A completed \"Claim for Compensation\" (Form C-4) must be filed within 90 days after an accident or OD. The treating physician or chiropractor must, within 3 working days after treatment, complete and mail to the employer, the employer's insurer and third-party , the " Claim for Compensation.    Medical Treatment: If you require medical treatment for your on-the-job injury or OD, you may be required to select a physician or chiropractor from a list provided by your workers’ compensation insurer, if it has contracted with an Organization for Managed Care (MCO) or Preferred Provider Organization (PPO) or providers of health care. If your employer has not entered into a contract with an MCO or PPO, you may select a physician or chiropractor from the Panel of Physicians and Chiropractors. Any medical costs related to your industrial injury or OD will be paid by your insurer.    Temporary Total Disability (TTD): If your doctor has certified that you are unable to work for a period of at least 5 consecutive days, or 5 cumulative days in a 20-day period, or places restrictions on you that your employer does not accommodate, you may be entitled to TTD compensation.    Temporary Partial Disability (TPD): If the wage you receive upon reemployment is less than the compensation for TTD to which you are entitled, the insurer may be required to pay you TPD compensation to make up the difference. TPD can only be paid for a maximum of 24 months.    Permanent Partial Disability (PPD): When your medical condition is stable and there is an indication of a PPD as a result of your injury or OD, within 30 days, your insurer must arrange for an evaluation by a rating physician or chiropractor to determine the degree of your PPD. The amount of your PPD award depends on the date of injury, the results of the PPD evaluation, your age and wage.    Permanent Total Disability (PTD): If you are medically certified by a treating physician or chiropractor as permanently and totally disabled and have been granted a PTD status by your insurer, you are entitled to receive monthly benefits not to exceed 66 2/3% of your average monthly wage. The amount of your PTD payments is subject to reduction if you previously  received a lump-sum PPD award.    Vocational Rehabilitation Services: You may be eligible for vocational rehabilitation services if you are unable to return to the job due to a permanent physical impairment or permanent restrictions as a result of your injury or occupational disease.    Transportation and Per Grace Reimbursement: You may be eligible for travel expenses and per grace associated with medical treatment.    Reopening: You may be able to reopen your claim if your condition worsens after claim closure.     Appeal Process: If you disagree with a written determination issued by the insurer or the insurer does not respond to your request, you may appeal to the Department of Administration, , by following the instructions contained in your determination letter. You must appeal the determination within 70 days from the date of the determination letter at [address], [address], [place], [place] [address], or [address], [address], [place] [address]. If you disagree with the  decision, you may appeal to the Department of Administration, . You must file your appeal within 30 days from the date of the  decision letter at [address], [address], [place], [place] [address], or [address], [address], [place], [place] [address]. If you disagree with a decision of an , you may file a petition for judicial review with the District Court. You must do so within 30 days of the Appeal Officer’s decision. You may be represented by an  at your own expense or you may contact the [facility] for possible representation.    [place]  for Injured Workers (NAIW): If you disagree with a  decision, you may request that NAIW represent you without charge at an  Hearing. For information regarding denial of benefits, you may contact the [facility] at: [address], [address],  [place] [address], [phone], or [address] [facility] [address] [facility], [address], [place] [address], [phone]    To File a Complaint with the Division: If you wish to file a complaint with the  of the Division of Industrial Relations (DIR),  please contact the Workers’ Compensation Section, [address], [address], [place], [place] [address], telephone [phone]) [phone], or [address], [address], [place], [place] [address], telephone [phone]) [phone].    For assistance with Workers’ Compensation Issues: You may contact the [facility] Office for Consumer Health Assistance, [address], [facility] [address], [place] [address], Toll Free 1-913.109.8347, Web site: http://[facility].nv.[facility]/Programs/GERI E-mail: [email or web address]@[email or web address].[facility]              __________________________________________________________________                                    _________________            Employee Name / Signature                                                                                                                            Date                                                                                                                                                                                                                              D-2 (rev. 10/20)

## 2023-10-23 ENCOUNTER — OCCUPATIONAL MEDICINE (OUTPATIENT)
Dept: OCCUPATIONAL MEDICINE | Facility: CLINIC | Age: 28
End: 2023-10-23
Payer: COMMERCIAL

## 2023-10-23 VITALS
BODY MASS INDEX: 18.81 KG/M2 | HEIGHT: 69 IN | SYSTOLIC BLOOD PRESSURE: 118 MMHG | TEMPERATURE: 97.8 F | DIASTOLIC BLOOD PRESSURE: 72 MMHG | RESPIRATION RATE: 18 BRPM | WEIGHT: 127 LBS | OXYGEN SATURATION: 97 % | HEART RATE: 80 BPM

## 2023-10-23 DIAGNOSIS — S29.019D STRAIN OF THORACIC SPINE, SUBSEQUENT ENCOUNTER: ICD-10-CM

## 2023-10-23 DIAGNOSIS — S39.012D STRAIN OF LUMBAR REGION, SUBSEQUENT ENCOUNTER: ICD-10-CM

## 2023-10-23 PROCEDURE — 3078F DIAST BP <80 MM HG: CPT | Performed by: PREVENTIVE MEDICINE

## 2023-10-23 PROCEDURE — 3074F SYST BP LT 130 MM HG: CPT | Performed by: PREVENTIVE MEDICINE

## 2023-10-23 PROCEDURE — 99202 OFFICE O/P NEW SF 15 MIN: CPT | Performed by: PREVENTIVE MEDICINE

## 2023-10-23 ASSESSMENT — FIBROSIS 4 INDEX: FIB4 SCORE: 0.49

## 2023-10-23 NOTE — PROGRESS NOTES
"Subjective:     Clovis Fox is a 27 y.o. 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  HEAVEN: Patient reports that he was lifting up an approximate 40 pound door several times throughout his shift.  He noted some gradual left upper thoracic pain throughout the shift on this day.  He was seen urgent care x1, advised OTC Voltaren gel, ibuprofen, tizanidine and work restrictions.  Patient states overall he feel significantly improved.  He states that he feels over 60 to 70% improved in the morning and that improves throughout the day as his muscles warm up.  He states has been working light duty without difficulty and feels like he can return to full duty.  He states that the pain is pain which is present in the morning is most on the left upper thoracic area and midline area.  Denies radicular symptoms.    ROS: All systems were reviewed on intake form, form was reviewed and signed. See scanned documents in media. Pertinent positives and negatives included in HPI.    PMH: No pertinent past medical history to this problem  MEDS: Medications were reviewed in Epic  ALLERGIES:   Allergies   Allergen Reactions    Amoxicillin Hives    Penicillins Hives     SOCHX: Works as a  at [employer]  FH: No pertinent family history to this problem       Objective:     /72   Pulse 80   Temp 36.6 °C (97.8 °F) (Temporal)   Resp 18   Ht 1.753 m (5' 9\")   Wt 57.6 kg (127 lb)   SpO2 97%   BMI 18.75 kg/m²     Constitutional: Patient is in no acute distress. Appears well-developed and well-nourished.   HENT: Normocephalic and atraumatic. EOM are normal. No scleral icterus.   Cardiovascular: Normal rate.    Pulmonary/Chest: Effort normal. No respiratory distress.   Neurological: Patient is alert and oriented to person, place, and time.   Skin: Skin is warm and dry.   Psychiatric: Normal mood and affect. Behavior is normal. "     Spine: No gross deformity.  No tenderness palpation.  Full range of motion no pain or difficulty.    Assessment/Plan:       1. Strain of thoracic spine, subsequent encounter    2. Strain of lumbar region, subsequent encounter    Released to Full Duty FROM 10/23/2023 TO       Released from care  Full duty  Follow-up as needed    Differential diagnosis, natural history, supportive care, and indications for immediate follow-up discussed.    Approximately 25 minutes were spent in reviewing notes, preparing for visit, obtaining history, exam and evaluation, patient counseling/education and post visit documentation/orders.

## 2023-10-23 NOTE — LETTER
[address],   [address] [address]  Phone:  [phone] - Fax:  [phone]   [facility] Progress Report and Disability Certification  Date of Service: 10/23/2023   No Show:  No  Date / Time of Next Visit:  Release from care   Claim Information   Patient Name: Clovis Fox  Claim Number:     Employer: [employer]  Date of Injury: 9/26/2023     Insurer / TPA: [employer]  ID / SSN:     Occupation:   Diagnosis: Diagnoses of Strain of thoracic spine, subsequent encounter and Strain of lumbar region, subsequent encounter were pertinent to this visit.    Medical Information   Related to Industrial Injury? 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  HEAVEN: Patient reports that he was lifting up an approximate 40 pound door several times throughout his shift.  He noted some gradual left upper thoracic pain throughout the shift on this day.  He was seen urgent care x1, advised OTC Voltaren gel, ibuprofen, tizanidine and work restrictions.  Patient states overall he feel significantly improved.  He states that he feels over 60 to 70% improved in the morning and that improves throughout the day as his muscles warm up.  He states has been working light duty without difficulty and feels like he can return to full duty.  He states that the pain is pain which is present in the morning is most on the left upper thoracic area and midline area.  Denies radicular symptoms.   Objective Findings: Spine: No gross deformity.  No tenderness palpation.  Full range of motion no pain or difficulty.   Pre-Existing Condition(s):     Assessment:   Condition Improved    Status: Discharged /  MMI  Permanent Disability:No    Plan:      Diagnostics:      Comments:  Released from care  Full duty  Follow-up as needed    Disability Information   Status: Released to  Full Duty    From:  10/23/2023  Through:   Restrictions are:     Physical Restrictions   Sitting:    Standing:    Stooping:    Bending:      Squatting:    Walking:    Climbing:    Pushing:      Pulling:    Other:    Reaching Above Shoulder (L):   Reaching Above Shoulder (R):       Reaching Below Shoulder (L):    Reaching Below Shoulder (R):      Not to exceed Weight Limits   Carrying(hrs):   Weight Limit(lb):   Lifting(hrs):   Weight  Limit(lb):     Comments:      Repetitive Actions   Hands: i.e. Fine Manipulations from Grasping:     Feet: i.e. Operating Foot Controls:     Driving / Operate Machinery:     Health Care Provider’s Original or Electronic Signature  Jt Garcia D.O. Health Care Provider’s Original or Electronic Signature    Jt Garcia DO MPH     Clinic Name / Location: [place] [address]  [place] [address] Clinic Phone Number: Dept: [phone]   Appointment Time: 8:30 Am Visit Start Time: 8:26 AM   Check-In Time:  8:19 Am Visit Discharge Time:  8:37 AM    Original-Treating Physician or Chiropractor    Page 2-Insurer/TPA    Page 3-Employer    Page 4-Employee

## 2023-12-24 ENCOUNTER — HOSPITAL ENCOUNTER (EMERGENCY)
Facility: MEDICAL CENTER | Age: 28
End: 2023-12-24
Attending: EMERGENCY MEDICINE
Payer: COMMERCIAL

## 2023-12-24 ENCOUNTER — APPOINTMENT (OUTPATIENT)
Dept: RADIOLOGY | Facility: MEDICAL CENTER | Age: 28
End: 2023-12-24
Attending: EMERGENCY MEDICINE
Payer: COMMERCIAL

## 2023-12-24 ENCOUNTER — APPOINTMENT (OUTPATIENT)
Dept: URGENT CARE | Facility: PHYSICIAN GROUP | Age: 28
End: 2023-12-24
Payer: COMMERCIAL

## 2023-12-24 VITALS
BODY MASS INDEX: 19.53 KG/M2 | TEMPERATURE: 98 F | DIASTOLIC BLOOD PRESSURE: 79 MMHG | HEART RATE: 61 BPM | RESPIRATION RATE: 16 BRPM | WEIGHT: 132.28 LBS | SYSTOLIC BLOOD PRESSURE: 111 MMHG | OXYGEN SATURATION: 96 %
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
## 2023-12-24 DIAGNOSIS — S06.0X0A CONCUSSION WITHOUT LOSS OF CONSCIOUSNESS, INITIAL ENCOUNTER: ICD-10-CM

## 2023-12-24 DIAGNOSIS — S16.1XXA ACUTE STRAIN OF NECK MUSCLE, INITIAL ENCOUNTER: ICD-10-CM

## 2023-12-24 PROCEDURE — 700102 HCHG RX REV CODE 250 W/ 637 OVERRIDE(OP): Performed by: EMERGENCY MEDICINE

## 2023-12-24 PROCEDURE — A9270 NON-COVERED ITEM OR SERVICE: HCPCS | Performed by: EMERGENCY MEDICINE

## 2023-12-24 PROCEDURE — 70450 CT HEAD/BRAIN W/O DYE: CPT

## 2023-12-24 PROCEDURE — 72125 CT NECK SPINE W/O DYE: CPT

## 2023-12-24 PROCEDURE — 99283 EMERGENCY DEPT VISIT LOW MDM: CPT

## 2023-12-24 RX ORDER — OXYCODONE HYDROCHLORIDE AND ACETAMINOPHEN 5; 325 MG/1; MG/1
2 TABLET ORAL ONCE
Status: COMPLETED | OUTPATIENT
Start: 2023-12-24 | End: 2023-12-24

## 2023-12-24 RX ADMIN — OXYCODONE AND ACETAMINOPHEN 2 TABLET: 5; 325 TABLET ORAL at 16:03

## 2023-12-24 ASSESSMENT — FIBROSIS 4 INDEX: FIB4 SCORE: 0.51

## 2023-12-24 ASSESSMENT — PAIN DESCRIPTION - PAIN TYPE: TYPE: ACUTE PAIN

## 2023-12-24 NOTE — ED PROVIDER NOTES
ER Provider Note    Scribed for Joe Mcknight M.d. by Cora James. 12/24/2023  3:14 PM    Primary Care Provider: Jonathan Mckinnon M.D.    CHIEF COMPLAINT  Chief Complaint   Patient presents with    Headache            HPI/ROS  LIMITATION TO HISTORY   Select: : None  OUTSIDE HISTORIAN(S):  None    Darrell Fox is a 28 y.o. male who presents to the ED complaining of headache secondary to a snowboard accident onset four days ago. Patient states he was wearing a helmet and did not lose consciousness. Patient hit the back of his head on ice. He reports associated ringing in his ears, change in balance, nausea, neck pain and lightheadedness. He states his neck pain is new. He denies vomiting. Patient reports going to Urgent Care for further evaluation but was sent to ED for further evaluation. Patient took Tylenol with no alleviation a few days ago, he did not have any Tylenol today. Drug allergy to amoxicillin and penicillin.     PAST MEDICAL HISTORY  No pertinent past medical history    SURGICAL HISTORY  Past Surgical History:   Procedure Laterality Date    PB KNEE SCOPE,PART SYNOVECT Right 7/5/2023    Procedure: RIGHT KNEE ARTHROSCOPY WITH FAT PAD DEBRIDEMENT, RIGHT PLICA EXCISION, REPAIRS AS INDICATED;  Surgeon: Teagan Salvador M.D.;  Location: [facility];  Service: Orthopedics     FAMILY HISTORY  Family History   Problem Relation Age of Onset    Hypertension Mother     Cancer Father     Diabetes Paternal Grandmother      SOCIAL HISTORY   reports that he has been smoking pipe. He has never used smokeless tobacco. He reports that he does not currently use alcohol. He reports that he does not currently use drugs after having used the following drugs: Cocaine.    CURRENT MEDICATIONS  Previous Medications    ONDANSETRON (ZOFRAN) 4 MG TAB TABLET    Take 1 Tablet by mouth every four hours as needed for Nausea/Vomiting.    PAROXETINE (PAXIL) 20 MG TAB    Take 1 Tablet by mouth every day.     TIZANIDINE (ZANAFLEX) 4 MG TAB    Take 1 Tablet by mouth every 6 hours as needed (muscle spams/pain).     ALLERGIES  Amoxicillin and Penicillins    PHYSICAL EXAM  /74   Pulse 70   Temp 36.4 °C (97.5 °F) (Temporal)   Resp 14   Wt 60 kg (132 lb 4.4 oz)   SpO2 100%   BMI 19.53 kg/m²   Constitutional: Alert in no apparent distress.  HENT: No signs of trauma, Bilateral external ears normal, Nose normal. Uvula midline.   Eyes: Pupils are equal and reactive, Conjunctiva normal, Non-icteric.   Neck: Normal range of motion, Supple, No stridor. Midline C-spine tenderness.   Lymphatic: No lymphadenopathy noted.   Cardiovascular: Regular rate and rhythm, no murmurs.   Thorax & Lungs: Normal breath sounds, No respiratory distress, No wheezing, No chest tenderness.   Abdomen: Bowel sounds normal, Soft, No tenderness, No peritoneal signs, No masses, No pulsatile masses.   Skin: Warm, Dry, No erythema, No rash.   Back: No bony tenderness, No CVA tenderness.   Extremities: Intact distal pulses, No edema, No tenderness, No cyanosis.   Musculoskeletal: Good range of motion in all major joints. No tenderness to palpation or major deformities noted.   Neurologic: Alert , Normal motor function, Normal sensory function, No focal deficits noted. Cranial nerves II through XII intact.  5 out of 5 strength x4.  Sensation intact light touch.  Normal finger-nose-finger.  Normal reflexes bilaterally.  No clonus. EOMI. PERRL.   Psychiatric: Affect normal, Judgment normal, Mood normal.     DIAGNOSTIC STUDIES  Radiology:   The attending emergency physician has independently interpreted the diagnostic imaging associated with this visit and am waiting the final reading from the radiologist.   Preliminary interpretation is a follows: no ICH  Radiologist interpretation:   CT-CSPINE WITHOUT PLUS RECONS   Final Result      No CT evidence of acute cervical spine abnormality.      CT-HEAD W/O   Final Result      No acute intracranial  abnormality is identified.           COURSE & MEDICAL DECISION MAKING   ED Observation Status? No; Patient does not meet criteria for ED Observation.     INITIAL ASSESSMENT, COURSE AND PLAN  Care Narrative:   3:21 PM - Patient was seen and evaluated at bedside. Patient presents to the ED for a head ache secondary to a snowboarding accident. On exam patient has midline C spine tenderness. After my exam, I discussed with the patient the plan of care, which includes treating the patient with medication for their symptoms, as well as obtaining imaging for further evaluation. Patient understands and verbalizes agreement to plan of care.     Differential diagnoses include but not limited to:   #Headache  - Concussion, intracranial hemorrhage.    Given patient's persistent dizziness after significant mechanism closed head injury while snowboarding CT head obtained with no evidence of ICH  Given C-spine tenderness patient with CT C-spine ordered with no evidence of fracture  Treated with Percocet 5-325 mg for symptomatic management.    6:44 PM - Patient was reevaluated at bedside. Patient feels improved at this time. Discussed radiology results with the patient and informed them he does not have a brain bleed and his symptoms are consistent with a concussion.  I then informed the patient of my plan for discharge, which includes strict return precautions for any new or worsening symptoms. Patient understands and verbalizes agreement to plan of care. Patient is comfortable going home at this time.     DISPOSITION AND DISCUSSIONS  I have discussed management of the patient with the following physicians and ANGELICA's:  None    Discussion of management with other QHP or appropriate source(s): None     Escalation of care considered, and ultimately not performed: blood analysis; which was not indicated at this time.     Patient with no abnormal physical exam findings or    Barriers to care at this time, including but not limited to:   None .         The patient will return for new or worsening symptoms and is stable at the time of discharge.    DISPOSITION:  Patient will be discharged home in stable condition.    FOLLOW UP:  Jonathan Mckinnon M.D.  [address] [place] [id number] [phone]    In 3 days      [facility], Emergency Dept  [address] [facility] [address] [phone]    If symptoms worsen    FINAL DIAGNOSIS  1. Concussion without loss of consciousness, initial encounter    2. Acute strain of neck muscle, initial encounter    3. Snowboard accident, initial encounter       The note accurately reflects work and decisions made by me.  Joe Mcknight M.D.  12/24/2023  10:21 PM

## 2023-12-24 NOTE — Clinical Note
Darrell Fox was seen and treated in our emergency department on 12/24/2023.  He may return to work on 12/31/2023.       If you have any questions or concerns, please don't hesitate to call.      Joe Mcknight M.D.

## 2024-01-03 ENCOUNTER — OFFICE VISIT (OUTPATIENT)
Dept: MEDICAL GROUP | Facility: PHYSICIAN GROUP | Age: 29
End: 2024-01-03
Payer: COMMERCIAL

## 2024-01-03 VITALS
SYSTOLIC BLOOD PRESSURE: 110 MMHG | HEIGHT: 70 IN | HEART RATE: 80 BPM | WEIGHT: 135.8 LBS | TEMPERATURE: 98.8 F | OXYGEN SATURATION: 98 % | DIASTOLIC BLOOD PRESSURE: 74 MMHG | BODY MASS INDEX: 19.44 KG/M2

## 2024-01-03 DIAGNOSIS — F17.290 PIPE SMOKER: ICD-10-CM

## 2024-01-03 DIAGNOSIS — S06.0X0D CONCUSSION WITHOUT LOSS OF CONSCIOUSNESS, SUBSEQUENT ENCOUNTER: ICD-10-CM

## 2024-01-03 DIAGNOSIS — F41.9 ANXIETY: ICD-10-CM

## 2024-01-03 DIAGNOSIS — R73.03 PREDIABETES: ICD-10-CM

## 2024-01-03 DIAGNOSIS — E55.9 VITAMIN D DEFICIENCY: ICD-10-CM

## 2024-01-03 PROBLEM — S06.0XAA CONCUSSION: Status: ACTIVE | Noted: 2024-01-03

## 2024-01-03 PROCEDURE — 3074F SYST BP LT 130 MM HG: CPT | Performed by: INTERNAL MEDICINE

## 2024-01-03 PROCEDURE — 3078F DIAST BP <80 MM HG: CPT | Performed by: INTERNAL MEDICINE

## 2024-01-03 PROCEDURE — 99214 OFFICE O/P EST MOD 30 MIN: CPT | Performed by: INTERNAL MEDICINE

## 2024-01-03 ASSESSMENT — FIBROSIS 4 INDEX: FIB4 SCORE: 0.51

## 2024-01-03 ASSESSMENT — PATIENT HEALTH QUESTIONNAIRE - PHQ9: CLINICAL INTERPRETATION OF PHQ2 SCORE: 0

## 2024-01-03 NOTE — ASSESSMENT & PLAN NOTE
This is a chronic recurrent condition.  The patient no longer take Paxil.Patient states that his symptoms are fairly well-controlled.  Patient denies SI

## 2024-01-03 NOTE — ASSESSMENT & PLAN NOTE
New condition.  Patient reported head injury snowboarding on December 22, 2023.  Patient stated that he was wearing a helmet at the time.  Patient reported striking back of head against Packed snow  Patient denies loss of consciousness.  He was seen at [facility] emergency room  Imaging results reviewed with the patient.  CT-CSPINE WITHOUT PLUS RECONS  Narrative: 12/24/2023 4:28 PM    HISTORY/REASON FOR EXAM: Pain Following Trauma.  Neck pain    TECHNIQUE/EXAM DESCRIPTION:  CT cervical spine without contrast, with reconstructions.    Thin-section helical scanning was performed from the skull base through T1. Sagittal and coronal multiplanar reconstructions were generated from the axial images.    Low dose optimization technique was utilized for this CT exam including automated exposure control and adjustment of the mA and/or kV according to patient size.    COMPARISON: None available.    FINDINGS:  No fracture or subluxation is seen. Alignment is normal.    No abnormal prevertebral soft tissue swelling.    There is smooth reversal of the normal lordosis centered at C4 which is not accompanied by listhesis and is most likely chronic    The visualized aerodigestive tract is normal in appearance.    No pneumothorax at the lung apices.  Impression: No CT evidence of acute cervical spine abnormality.  CT-HEAD W/O  Narrative: 12/24/2023 4:28 PM    HISTORY/REASON FOR EXAM:  HEADACHE. Snowboard crash.    TECHNIQUE/EXAM DESCRIPTION AND NUMBER OF VIEWS:    CT of the head without contrast.    Contiguous 5 mm axial sections were obtained from the skull base through the vertex.    Up to date radiation dose reduction adjustments have been utilized to meet ALARA standards for radiation dose reduction.    COMPARISON:   None.    FINDINGS:  No acute intracranial hemorrhage is seen.    There is no midline shift.    Ventricles are normal in size and configuration.    Gray white junction differentiation is distinct.    Visualized paranasal  sinuses and mastoid air cells are clear.    No acute calvarium abnormality is noted.  Impression: No acute intracranial abnormality is identified.       Patient reported intermittent headaches and dizziness.  Patient denies fever or chills.  He denies change in vision motor weakness paresthesia.  Denies slurred speech.

## 2024-01-03 NOTE — PROGRESS NOTES
PRIMARY CARE CLINIC VISIT        Chief Complaint   Patient presents with    Follow-Up     Concussion follow up.   Anxiety  Vitamin D deficiency  Pipe smoker   prediabetes  Request lab test      History of Present Illness     Concussion  New condition.  Patient reported head injury snowboarding on December 22, 2023.  Patient stated that he was wearing a helmet at the time.  Patient reported striking back of head against Packed snow  Patient denies loss of consciousness.  Patient did wear a helmet  He was seen at [facility] emergency room  Imaging results reviewed with the patient.  CT-CSPINE WITHOUT PLUS RECONS  Narrative: 12/24/2023 4:28 PM    HISTORY/REASON FOR EXAM: Pain Following Trauma.  Neck pain    TECHNIQUE/EXAM DESCRIPTION:  CT cervical spine without contrast, with reconstructions.    Thin-section helical scanning was performed from the skull base through T1. Sagittal and coronal multiplanar reconstructions were generated from the axial images.    Low dose optimization technique was utilized for this CT exam including automated exposure control and adjustment of the mA and/or kV according to patient size.    COMPARISON: None available.    FINDINGS:  No fracture or subluxation is seen. Alignment is normal.    No abnormal prevertebral soft tissue swelling.    There is smooth reversal of the normal lordosis centered at C4 which is not accompanied by listhesis and is most likely chronic    The visualized aerodigestive tract is normal in appearance.    No pneumothorax at the lung apices.  Impression: No CT evidence of acute cervical spine abnormality.  CT-HEAD W/O  Narrative: 12/24/2023 4:28 PM    HISTORY/REASON FOR EXAM:  HEADACHE. Snowboard crash.    TECHNIQUE/EXAM DESCRIPTION AND NUMBER OF VIEWS:    CT of the head without contrast.    Contiguous 5 mm axial sections were obtained from the skull base through the vertex.    Up to date radiation dose reduction adjustments have been utilized to meet ALARA standards for  radiation dose reduction.    COMPARISON:   None.    FINDINGS:  No acute intracranial hemorrhage is seen.    There is no midline shift.    Ventricles are normal in size and configuration.    Gray white junction differentiation is distinct.    Visualized paranasal sinuses and mastoid air cells are clear.    No acute calvarium abnormality is noted.  Impression: No acute intracranial abnormality is identified.       Patient reported intermittent headaches and dizziness.  Patient denies fever or chills.  He denies change in vision motor weakness paresthesia.  Denies slurred speech.      Anxiety  This is a chronic recurrent condition.  The patient no longer take Paxil.Patient states that his symptoms are fairly well-controlled.  Patient denies SI    Pipe smoker  Patient reported that he has stopped smoking pipe recently.  Patient was congratulated.    Prediabetes  Chronic condition.  The patient currently on diet therapy.  Lab test requested for follow-up.    Vitamin D deficiency  Chronic condition.  The patient is due for lab test.    Current Outpatient Medications on File Prior to Visit   Medication Sig Dispense Refill    ondansetron (ZOFRAN) 4 MG Tab tablet Take 1 Tablet by mouth every four hours as needed for Nausea/Vomiting. 20 Tablet 0     No current facility-administered medications on file prior to visit.        Allergies: Amoxicillin and Penicillins    Current Outpatient Medications Ordered in Epic   Medication Sig Dispense Refill    ondansetron (ZOFRAN) 4 MG Tab tablet Take 1 Tablet by mouth every four hours as needed for Nausea/Vomiting. 20 Tablet 0     No current Epic-ordered facility-administered medications on file.       History reviewed. No pertinent past medical history.    Past Surgical History:   Procedure Laterality Date    PB KNEE SCOPE,PART SYNOVECT Right 7/5/2023    Procedure: RIGHT KNEE ARTHROSCOPY WITH FAT PAD DEBRIDEMENT, RIGHT PLICA EXCISION, REPAIRS AS INDICATED;  Surgeon: Teagan Salvador,  "M.D.;  Location: [facility];  Service: Orthopedics       Family History   Problem Relation Age of Onset    Hypertension Mother     Cancer Father     Diabetes Paternal Grandmother        Social History     Tobacco Use   Smoking Status Every Day    Types: Pipe   Smokeless Tobacco Never       Social History     Substance and Sexual Activity   Alcohol Use Not Currently       Review of systems.  As per HPI above. All other systems reviewed and negative.      Past Medical, Social, and Family history reviewed and updated in EPIC     Objective     /74 (BP Location: Left arm, Patient Position: Sitting, BP Cuff Size: Adult)   Pulse 80   Temp 37.1 °C (98.8 °F) (Temporal)   Ht 1.778 m (5' 10\")   Wt 61.6 kg (135 lb 12.8 oz)   SpO2 98%    Body mass index is 19.49 kg/m².    General: alert in no apparent distress.  Cardiovascular: regular rate and rhythm  Pulmonary: lungs : no wheezing   Gastrointestinal: BS present.   Cranial nerves II through grossly intact  Mental status and speech appropriate  Gait no ataxia noted  Fundi difficult to visualize  Head normocephalic.  There was no bleeding or any acute abnormality noted.  No scalp tenderness noted  Lab Results   Component Value Date/Time    HBA1C 5.9 (H) 08/26/2022 01:43 PM       Lab Results   Component Value Date/Time    WBC 3.4 (L) 08/26/2022 01:43 PM    HEMOGLOBIN 15.7 08/26/2022 01:43 PM    HEMATOCRIT 48.5 08/26/2022 01:43 PM    MCV 92.2 08/26/2022 01:43 PM    PLATELETCT 223 08/26/2022 01:43 PM         Lab Results   Component Value Date/Time    SODIUM 138 08/26/2022 01:43 PM    POTASSIUM 4.6 08/26/2022 01:43 PM    GLUCOSE 89 08/26/2022 01:43 PM    BUN 13 08/26/2022 01:43 PM    CREATININE 0.90 08/26/2022 01:43 PM       Lab Results   Component Value Date/Time    CHOLSTRLTOT 177 08/26/2022 01:43 PM    TRIGLYCERIDE 91 08/26/2022 01:43 PM    HDL 74 08/26/2022 01:43 PM    LDL 85 08/26/2022 01:43 PM       Lab Results   Component Value Date/Time    " ALTSGPT 20 08/26/2022 01:43 PM             Assessment and Plan     1. Concussion without loss of consciousness, subsequent encounter  New condition.  Patient reported recent head injury as above.  Fortunately  Brain imaging and CT C-spine reviewed no acute injury.  Patient still reported intermittent headaches and dizziness.    - Referral to Neurology  - Referral to Physical Therapy  Patient advised to try Tylenol as needed for headaches.  Advised the patient to get plenty of rest and stay well-hydrated.  Recommend patient to go to ER if symptoms worsen or change his condition.    2. Vitamin D deficiency  - VITAMIN D,25 HYDROXY (DEFICIENCY); Future  Chronic condition.  Current status unclear.  Lab test ordered for follow-up    3. Anxiety  Chronic stable condition off of Paxil.  Counseling given today.  Continue to monitor    4. Pipe smoker  Patient reported that he has stopped smoking.  Patient was congratulated    5. Prediabetes  - Basic Metabolic Panel; Future  - HEMOGLOBIN A1C; Future  Chronic condition.  Current status unclear.  Lab test ordered for follow-up          Attestation: I spent:   36  min -  That includes time for chart review before the visit, the actual patient visit, and time spent on documentation in EMR after the visit.  Chart review/prep, review of other providers' records, imaging/lab review, face-to-face time for history/examination, pt's counseling/education, ordering, prescribing,  review of results/meds/ treatment plan with patient, and care coordination.                 Please note that this dictation was created using voice recognition software. I have made every reasonable attempt to correct obvious errors, but I expect that there are errors of grammar and possibly content that I did not discover before finalizing the note.    Jonathan Mckinnon MD  Internal Medicine  [facility]

## 2024-01-10 ENCOUNTER — TELEPHONE (OUTPATIENT)
Dept: HEALTH INFORMATION MANAGEMENT | Facility: OTHER | Age: 29
End: 2024-01-10
Payer: COMMERCIAL

## 2024-01-31 ENCOUNTER — HOSPITAL ENCOUNTER (EMERGENCY)
Facility: MEDICAL CENTER | Age: 29
End: 2024-01-31
Attending: EMERGENCY MEDICINE
Payer: COMMERCIAL

## 2024-01-31 ENCOUNTER — APPOINTMENT (OUTPATIENT)
Dept: PHYSICAL THERAPY | Facility: REHABILITATION | Age: 29
End: 2024-01-31
Attending: INTERNAL MEDICINE
Payer: COMMERCIAL

## 2024-01-31 VITALS
HEIGHT: 70 IN | TEMPERATURE: 98.2 F | WEIGHT: 132.72 LBS | RESPIRATION RATE: 15 BRPM | DIASTOLIC BLOOD PRESSURE: 74 MMHG | HEART RATE: 66 BPM | SYSTOLIC BLOOD PRESSURE: 110 MMHG | BODY MASS INDEX: 19 KG/M2 | OXYGEN SATURATION: 96 %

## 2024-01-31 DIAGNOSIS — Z77.098 CHEMICAL EXPOSURE OF EYE: ICD-10-CM

## 2024-01-31 PROCEDURE — 700101 HCHG RX REV CODE 250: Performed by: EMERGENCY MEDICINE

## 2024-01-31 PROCEDURE — 99283 EMERGENCY DEPT VISIT LOW MDM: CPT

## 2024-01-31 RX ORDER — TETRACAINE HYDROCHLORIDE 5 MG/ML
2 SOLUTION OPHTHALMIC ONCE
Status: COMPLETED | OUTPATIENT
Start: 2024-01-31 | End: 2024-01-31

## 2024-01-31 RX ADMIN — FLUORESCEIN SODIUM 1 MG: 1 STRIP OPHTHALMIC at 15:15

## 2024-01-31 RX ADMIN — TETRACAINE HYDROCHLORIDE 2 DROP: 5 SOLUTION OPHTHALMIC at 15:15

## 2024-01-31 ASSESSMENT — FIBROSIS 4 INDEX: FIB4 SCORE: 0.51

## 2024-01-31 NOTE — ED PROVIDER NOTES
"  ER Provider Note    Scribed for Cain Coon M.D. by Wanda Seymour. 1/31/2024  2:36 PM    Primary Care Provider: Jonathan Mckinnon M.D.    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 4/10 pain, redness and swelling left eye         HPI/ROS  LIMITATION TO HISTORY   Select: : None  OUTSIDE HISTORIAN(S):  None    Darrell Fox is a 28 y.o. male who presents to the ED complaining of eye injury onset yesterday around 3-4 PM. Patient reports he got powder coat in his eye yesterday at work. 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 He states associated symptoms of redness and swelling of left eye. He adds that he has came here in the past for similar complaints.     PAST MEDICAL HISTORY  History reviewed. No pertinent past medical history.    SURGICAL HISTORY  Past Surgical History:   Procedure Laterality Date    PB KNEE SCOPE,PART SYNOVECT Right 7/5/2023    Procedure: RIGHT KNEE ARTHROSCOPY WITH FAT PAD DEBRIDEMENT, RIGHT PLICA EXCISION, REPAIRS AS INDICATED;  Surgeon: Teagan Salvador M.D.;  Location: [facility];  Service: Orthopedics       FAMILY HISTORY  Family History   Problem Relation Age of Onset    Hypertension Mother     Cancer Father     Diabetes Paternal Grandmother        SOCIAL HISTORY   reports that he has been smoking pipe. He has never used smokeless tobacco. He reports that he does not currently use alcohol. He reports that he does not currently use drugs after having used the following drugs: Cocaine.    CURRENT MEDICATIONS  Previous Medications    ONDANSETRON (ZOFRAN) 4 MG TAB TABLET    Take 1 Tablet by mouth every four hours as needed for Nausea/Vomiting.       ALLERGIES  Amoxicillin and Penicillins    PHYSICAL EXAM  /70   Pulse 74   Temp 36.6 °C (97.8 °F) (Temporal)   Resp 18   Ht 1.778 m (5' 10\")   Wt 60.2 kg (132 lb 11.5 oz)   " SpO2 97%   BMI 19.04 kg/m²   Constitutional: Well developed, Well nourished, No acute distress, Non-toxic appearance.   HENT: Normocephalic, Atraumatic, Bilateral external ears normal, Oropharynx is clear mucous membranes are moist. No oral exudates or nasal discharge.   Eyes: Left eye clearly red, and mild ptosis and swelling on left side.Slit eye exam is normal.  Neck: Normal range of motion, No tenderness, Supple, No stridor. No meningismus.  Lymphatic: No lymphadenopathy noted.   Cardiovascular: Regular rate and rhythm without murmur rub or gallop.  Thorax & Lungs: Clear breath sounds bilaterally without wheezes, rhonchi or rales. There is no chest wall tenderness.   Abdomen: Soft non-tender non-distended. There is no rebound or guarding. No organomegaly is appreciated. Bowel sounds are normal.  Skin: Normal without rash.   Back: No CVA or spinal tenderness.   Extremities: Intact distal pulses, No edema, No tenderness, No cyanosis, No clubbing. Capillary refill is less than 2 seconds.  Musculoskeletal: Good range of motion in all major joints. No tenderness to palpation or major deformities noted.   Neurologic: Alert & oriented x 3, Normal motor function, Normal sensory function, No focal deficits noted. Reflexes are normal.  Psychiatric: Affect normal, Judgment normal, Mood normal. There is no suicidal ideation or patient reported hallucinations.        COURSE & MEDICAL DECISION MAKING     ED Observation Status? No; Patient does not meet criteria for ED Observation.     INITIAL ASSESSMENT, COURSE AND PLAN  Care Narrative:     2:36 PM - Patient seen and examined at bedside. Discussed plan of care, including performing a slit eye exam. Patient agrees to the plan of care. The patient will be medicated with fluorescein opthalmic trip and tetracaine 0.5%.     3:05 PM - Consulted with pharmacy to get medications for the patient's eye exam.     3:34 PM - I reevaluated the patient at bedside. Slit eye exam done by me.  I informed the patient that his exam is normal, so he likely has a chemical reaction to the eye. I discussed plan for discharge and follow up as outlined below. The patient is stable for discharge at this time and will return for any new or worsening symptoms. Patient verbalizes understanding and support with my plan for discharge.        DISPOSITION AND DISCUSSIONS  I have discussed management of the patient with the following physicians and ANGELICA's:  None    Discussion of management with other [facility] or appropriate source(s): Pharmacy consulted to get medications for the patient.      Barriers to care at this time, including but not limited to:  None .     The patient will return for new or worsening symptoms and is stable at the time of discharge.    The patient is referred to a primary physician for blood pressure management, diabetic screening, and for all other preventative health concerns.    DISPOSITION:  Patient will be discharged home in stable condition.    FOLLOW UP:  [place] [id number] [facility] [address]  [facility] [address]  [phone]  Schedule an appointment as soon as possible for a visit       FINAL DIAGNOSIS  1. Chemical exposure of eye      Wanda PORTILLO (Abner), am scribing for, and in the presence of, Cain Coon M.D..    Electronically signed by: Wanda Seymour (Abner), 1/31/2024    Cain PORTILLO M.D. personally performed the services described in this documentation, as scribed by Wanda Seymour in my presence, and it is both accurate and complete.     The note accurately reflects work and decisions made by me.  Cain Coon M.D.  1/31/2024  3:42 PM

## 2024-01-31 NOTE — DISCHARGE INSTRUCTIONS
No evidence of significant corneal injury or foreign body seen  This is likely the sequelae of chemical exposure 5 days ago and should resolve on its own over time.  Suggest follow-up with occupational provider

## 2024-01-31 NOTE — ED TRIAGE NOTES
"Darrell Barton Saunders  28 y.o. 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     Pt is GCS 15, speaking in full sentences, follows commands and responds appropriately to questions. Resp are even and unlabored.    Pt placed in lobby. Pt educated on triage process. Pt encouraged to alert staff for any changes.       Vitals:    01/31/24 1336   BP: 103/70   Pulse: 74   Resp: 18   Temp: 36.6 °C (97.8 °F)   SpO2: 97%     "

## 2024-02-08 ENCOUNTER — APPOINTMENT (OUTPATIENT)
Dept: PHYSICAL THERAPY | Facility: REHABILITATION | Age: 29
End: 2024-02-08
Attending: INTERNAL MEDICINE
Payer: COMMERCIAL

## 2024-02-15 ENCOUNTER — APPOINTMENT (OUTPATIENT)
Dept: PHYSICAL THERAPY | Facility: REHABILITATION | Age: 29
End: 2024-02-15
Attending: INTERNAL MEDICINE
Payer: COMMERCIAL

## 2024-02-20 ENCOUNTER — APPOINTMENT (OUTPATIENT)
Dept: PHYSICAL THERAPY | Facility: REHABILITATION | Age: 29
End: 2024-02-20
Attending: INTERNAL MEDICINE
Payer: COMMERCIAL

## 2024-02-29 ENCOUNTER — APPOINTMENT (OUTPATIENT)
Dept: PHYSICAL THERAPY | Facility: REHABILITATION | Age: 29
End: 2024-02-29
Attending: INTERNAL MEDICINE
Payer: COMMERCIAL

## 2024-03-04 ENCOUNTER — APPOINTMENT (OUTPATIENT)
Dept: PHYSICAL THERAPY | Facility: REHABILITATION | Age: 29
End: 2024-03-04
Attending: INTERNAL MEDICINE
Payer: COMMERCIAL

## 2024-03-15 ENCOUNTER — APPOINTMENT (OUTPATIENT)
Dept: PHYSICAL THERAPY | Facility: REHABILITATION | Age: 29
End: 2024-03-15
Attending: INTERNAL MEDICINE
Payer: COMMERCIAL

## 2024-03-18 ENCOUNTER — HOSPITAL ENCOUNTER (OUTPATIENT)
Dept: LAB | Facility: MEDICAL CENTER | Age: 29
End: 2024-03-18
Attending: INTERNAL MEDICINE
Payer: COMMERCIAL

## 2024-03-18 DIAGNOSIS — E55.9 VITAMIN D DEFICIENCY: ICD-10-CM

## 2024-03-18 DIAGNOSIS — R73.03 PREDIABETES: ICD-10-CM

## 2024-03-18 LAB
25(OH)D3 SERPL-MCNC: 15 NG/ML (ref 30–100)
ANION GAP SERPL CALC-SCNC: 10 MMOL/L (ref 7–16)
BUN SERPL-MCNC: 11 MG/DL (ref 8–22)
CALCIUM SERPL-MCNC: 9.2 MG/DL (ref 8.5–10.5)
CHLORIDE SERPL-SCNC: 104 MMOL/L (ref 96–112)
CO2 SERPL-SCNC: 25 MMOL/L (ref 20–33)
CREAT SERPL-MCNC: 0.94 MG/DL (ref 0.5–1.4)
EST. AVERAGE GLUCOSE BLD GHB EST-MCNC: 123 MG/DL
GFR SERPLBLD CREATININE-BSD FMLA CKD-EPI: 112 ML/MIN/1.73 M 2
GLUCOSE SERPL-MCNC: 89 MG/DL (ref 65–99)
HBA1C MFR BLD: 5.9 % (ref 4–5.6)
POTASSIUM SERPL-SCNC: 4.4 MMOL/L (ref 3.6–5.5)
SODIUM SERPL-SCNC: 139 MMOL/L (ref 135–145)

## 2024-03-18 PROCEDURE — 82306 VITAMIN D 25 HYDROXY: CPT

## 2024-03-18 PROCEDURE — 36415 COLL VENOUS BLD VENIPUNCTURE: CPT

## 2024-03-18 PROCEDURE — 80048 BASIC METABOLIC PNL TOTAL CA: CPT

## 2024-03-18 PROCEDURE — 83036 HEMOGLOBIN GLYCOSYLATED A1C: CPT

## 2024-03-19 PROBLEM — E55.9 VITAMIN D DEFICIENCY: Chronic | Status: ACTIVE | Noted: 2024-01-03

## 2024-03-19 PROBLEM — R73.03 PREDIABETES: Chronic | Status: ACTIVE | Noted: 2022-08-25

## 2024-03-22 ENCOUNTER — APPOINTMENT (OUTPATIENT)
Dept: PHYSICAL THERAPY | Facility: REHABILITATION | Age: 29
End: 2024-03-22
Attending: INTERNAL MEDICINE
Payer: COMMERCIAL

## 2024-03-27 ENCOUNTER — OFFICE VISIT (OUTPATIENT)
Dept: URGENT CARE | Facility: PHYSICIAN GROUP | Age: 29
End: 2024-03-27
Payer: COMMERCIAL

## 2024-03-27 VITALS
WEIGHT: 132 LBS | TEMPERATURE: 98 F | DIASTOLIC BLOOD PRESSURE: 56 MMHG | OXYGEN SATURATION: 98 % | RESPIRATION RATE: 16 BRPM | HEIGHT: 70 IN | HEART RATE: 68 BPM | SYSTOLIC BLOOD PRESSURE: 116 MMHG | BODY MASS INDEX: 18.9 KG/M2

## 2024-03-27 DIAGNOSIS — G44.309 POST-CONCUSSION HEADACHE: ICD-10-CM

## 2024-03-27 RX ORDER — ONDANSETRON 4 MG/1
4 TABLET, ORALLY DISINTEGRATING ORAL ONCE
Status: COMPLETED | OUTPATIENT
Start: 2024-03-27 | End: 2024-03-27

## 2024-03-27 RX ORDER — ONDANSETRON 4 MG/1
4 TABLET, ORALLY DISINTEGRATING ORAL EVERY 6 HOURS PRN
Qty: 15 TABLET | Refills: 0 | Status: SHIPPED | OUTPATIENT
Start: 2024-03-27

## 2024-03-27 RX ADMIN — ONDANSETRON 4 MG: 4 TABLET, ORALLY DISINTEGRATING ORAL at 14:18

## 2024-03-27 ASSESSMENT — FIBROSIS 4 INDEX: FIB4 SCORE: 0.51

## 2024-03-27 NOTE — LETTER
[facility] URGENT CARE [address] [address]     March 27, 2024    Patient: Darrell Fox   YOB: 1995   Date of Visit: 3/27/2024       To Whom It May Concern:    Darrell Fox was seen and treated in our department on 3/27/2024. Please excuse from work today.     Sincerely,     Guillermina Vasquez, MELISA.PMATTN.

## 2024-03-27 NOTE — PATIENT INSTRUCTIONS
[facility]rology   [address] [place], [place] [address]e [facility], [address]  [place] [address]  Phone: [phone] OR [phone]

## 2024-03-29 ENCOUNTER — APPOINTMENT (OUTPATIENT)
Dept: PHYSICAL THERAPY | Facility: REHABILITATION | Age: 29
End: 2024-03-29
Attending: INTERNAL MEDICINE
Payer: COMMERCIAL

## 2024-03-30 NOTE — PROGRESS NOTES
Chief Complaint   Patient presents with    Headache     Random headaches last 3 mth following a concussion         History of Present Illness: 28 y.o.  male presents to clinic with intermittent headaches after receiving a concussion 3 months ago.  Patient has been seen and evaluated for the symptoms previously.  He has undergone imaging which has all been negative.  Patient states today he had a headache and he did leave work early and is requesting a work note to excuse him.  He states he does get some nausea with the headaches.  He denies that these are the worst headache of his life.  He denies any thunderclap like headache.  He denies any focal neurological deficits as discussed.  No vision changes.      ROS:      As otherwise stated in HPI    Medical/SX/ Social History:  Reviewed per chart    Pertinent Medications:    Current Outpatient Medications on File Prior to Visit   Medication Sig Dispense Refill    ondansetron (ZOFRAN) 4 MG Tab tablet Take 1 Tablet by mouth every four hours as needed for Nausea/Vomiting. (Patient not taking: Reported on 3/27/2024) 20 Tablet 0     No current facility-administered medications on file prior to visit.        Allergies:    Amoxicillin and Penicillins     Problem list, medications, and allergies reviewed by myself today in Epic     Physical Exam:    Vitals:    03/27/24 1349   BP: 116/56   Pulse: 68   Resp: 16   Temp: 36.7 °C (98 °F)   SpO2: 98%             Physical Exam  Constitutional:       General: He is awake.      Appearance: Normal appearance. He is not ill-appearing, toxic-appearing or diaphoretic.   HENT:      Head: Normocephalic and atraumatic.      Right Ear: Tympanic membrane, ear canal and external ear normal.      Left Ear: Tympanic membrane, ear canal and external ear normal.   Eyes:      General: Lids are normal. Gaze aligned appropriately. No allergic shiner or scleral icterus.     Extraocular Movements: Extraocular movements intact.       Conjunctiva/sclera: Conjunctivae normal.      Pupils: Pupils are equal, round, and reactive to light.   Cardiovascular:      Rate and Rhythm: Normal rate and regular rhythm.      Pulses:           Radial pulses are 2+ on the right side and 2+ on the left side.      Heart sounds: Normal heart sounds.   Pulmonary:      Effort: Pulmonary effort is normal.      Breath sounds: Normal breath sounds and air entry. No decreased breath sounds, wheezing, rhonchi or rales.   Musculoskeletal:      Right lower leg: No edema.      Left lower leg: No edema.   Lymphadenopathy:      Cervical: No cervical adenopathy.   Skin:     General: Skin is warm.      Capillary Refill: Capillary refill takes less than 2 seconds.      Coloration: Skin is not cyanotic or pale.   Neurological:      Mental Status: He is alert and oriented to person, place, and time.      Cranial Nerves: Cranial nerves 2-12 are intact.      Sensory: Sensation is intact.      Motor: Motor function is intact.      Coordination: Coordination is intact.      Gait: Gait is intact.   Psychiatric:         Behavior: Behavior normal. Behavior is cooperative.              Medical Decision making and plan :  I personally reviewed prior external notes and test results pertinent to today's visit. Pt is clinically stable at today's acute urgent care visit.  Patient appears nontoxic with no acute distress noted. Appropriate for outpatient care at this time.      Pleasant 28 y.o. male presented clinic with fortunately patient's headache improved upon arrival to clinic.  I did supply him a work note to allow him to rest.  Due to intermittent nausea with his headaches I did supply him Zofran.  Did give in clinic Zofran.  Patient has a current referral that has been processed to follow-up with neurology.  Discussed the importance of this follow-up.  Patient has had imaging which is all been negative.  Discussed Tylenol ibuprofen for headaches.  Neuroexam intact.  No focal neurological  deficits.    1. Post-concussion headache    - ondansetron (Zofran ODT) dispertab 4 mg  - ondansetron (ZOFRAN ODT) 4 MG TABLET DISPERSIBLE; Take 1 Tablet by mouth every 6 hours as needed for Nausea/Vomiting for up to 15 doses.  Dispense: 15 Tablet; Refill: 0       Shared decision-making was utilized with patient for treatment plan. Medication discussed included indication for use and the potential benefits and side effects. Education was provided regarding the aforementioned assessments.  Differential Diagnosis, natural history, and supportive care discussed. All of the patient's questions were answered to their satisfaction at the time of discharge. Patient was encouraged to monitor symptoms closely. Those signs and symptoms which would warrant concern and mandate seeking a higher level of service through the emergency department discussed at length.  Patient stated agreement and understanding of this plan of care.    Disposition:  Home in stable condition       Voice Recognition Disclaimer:  Portions of this document were created using voice recognition software. The software does have a chance of producing errors of grammar and possibly content. I have made every reasonable attempt to correct obvious errors, but there may be errors of grammar and possibly content that I did not discover before finalizing the documentation.    PACHECO Nettles.

## 2024-04-02 ENCOUNTER — OFFICE VISIT (OUTPATIENT)
Dept: NEUROLOGY | Facility: MEDICAL CENTER | Age: 29
End: 2024-04-02
Attending: PSYCHIATRY & NEUROLOGY
Payer: COMMERCIAL

## 2024-04-02 VITALS
HEIGHT: 70 IN | RESPIRATION RATE: 14 BRPM | HEART RATE: 75 BPM | WEIGHT: 128.31 LBS | BODY MASS INDEX: 18.37 KG/M2 | TEMPERATURE: 98.5 F | SYSTOLIC BLOOD PRESSURE: 100 MMHG | DIASTOLIC BLOOD PRESSURE: 70 MMHG | OXYGEN SATURATION: 99 %

## 2024-04-02 DIAGNOSIS — F07.81 POST CONCUSSION SYNDROME: ICD-10-CM

## 2024-04-02 DIAGNOSIS — G43.909 EPISODIC MIGRAINE: ICD-10-CM

## 2024-04-02 PROBLEM — S06.0XAA CONCUSSION: Status: RESOLVED | Noted: 2024-01-03 | Resolved: 2024-04-02

## 2024-04-02 PROCEDURE — 3078F DIAST BP <80 MM HG: CPT | Performed by: PSYCHIATRY & NEUROLOGY

## 2024-04-02 PROCEDURE — 99211 OFF/OP EST MAY X REQ PHY/QHP: CPT | Performed by: PSYCHIATRY & NEUROLOGY

## 2024-04-02 PROCEDURE — 3074F SYST BP LT 130 MM HG: CPT | Performed by: PSYCHIATRY & NEUROLOGY

## 2024-04-02 PROCEDURE — 99204 OFFICE O/P NEW MOD 45 MIN: CPT | Performed by: PSYCHIATRY & NEUROLOGY

## 2024-04-02 RX ORDER — NORTRIPTYLINE HYDROCHLORIDE 10 MG/1
10 CAPSULE ORAL NIGHTLY
Qty: 30 CAPSULE | Refills: 2 | Status: SHIPPED | OUTPATIENT
Start: 2024-04-02

## 2024-04-02 ASSESSMENT — FIBROSIS 4 INDEX: FIB4 SCORE: 0.51

## 2024-04-02 NOTE — PROGRESS NOTES
Chief Complaint   Patient presents with    New Patient     Concussion without loss of consciousness       History of present illness:  Darrell Fox 28 y.o. male with HISTORY OF snowboard accident in Dec 2023. He fell backwards and hit his head. The next day, he became nauseated from light shining in his eye. He presented to the ED for this.   Since then, he has had poor memory and his friends have to remind him of plans. He is forgetting that he clocked in to work.    He is having headaches twice per week, lasting for 1 hour. This week, he had 3 headaches.   He is sleeping well.     Past medical history:   No past medical history on file.    Past surgical history:   Past Surgical History:   Procedure Laterality Date    PB KNEE SCOPE,PART SYNOVECT Right 7/5/2023    Procedure: RIGHT KNEE ARTHROSCOPY WITH FAT PAD DEBRIDEMENT, RIGHT PLICA EXCISION, REPAIRS AS INDICATED;  Surgeon: Teagan Salvador M.D.;  Location: [facility];  Service: Orthopedics       Family history:   Family History   Problem Relation Age of Onset    Hypertension Mother     Cancer Father     Diabetes Paternal Grandmother        Social history:   Social History     Socioeconomic History    Marital status: Single     Spouse name: Not on file    Number of children: Not on file    Years of education: Not on file    Highest education level: 12th grade   Occupational History    Not on file   Tobacco Use    Smoking status: Every Day     Types: Pipe    Smokeless tobacco: Never   Vaping Use    Vaping Use: Never used   Substance and Sexual Activity    Alcohol use: Not Currently    Drug use: Not Currently     Types: Cocaine    Sexual activity: Not Currently   Other Topics Concern    Not on file   Social History Narrative    Not on file     Social Determinants of Health     Financial Resource Strain: Medium Risk (6/20/2022)    Overall Financial Resource Strain (CARDIA)     Difficulty of Paying Living Expenses: Somewhat hard    Food Insecurity: Food Insecurity Present (6/20/2022)    Hunger Vital Sign     Worried About Running Out of Food in the Last Year: Sometimes true     Ran Out of Food in the Last Year: Sometimes true   Transportation Needs: No Transportation Needs (6/20/2022)    PRAPARE - Transportation     Lack of Transportation (Medical): No     Lack of Transportation (Non-Medical): No   Physical Activity: Sufficiently Active (6/20/2022)    Exercise Vital Sign     Days of Exercise per Week: 7 days     Minutes of Exercise per Session: 150+ min   Stress: Stress Concern Present (6/20/2022)    [employer] [place] - Occupational Stress Questionnaire     Feeling of Stress : Rather much   Social Connections: Socially Isolated (6/20/2022)    Social Connection and Isolation Panel [NHANES]     Frequency of Communication with Friends and Family: Once a week     Frequency of Social Gatherings with Friends and Family: Twice a week     Attends Sabianist Services: Never     Active Member of Clubs or Organizations: No     Attends Club or Organization Meetings: Never     Marital Status: Never    Intimate Partner Violence: Not on file   Housing Stability: High Risk (6/20/2022)    Housing Stability Vital Sign     Unable to Pay for Housing in the Last Year: Yes     Number of Places Lived in the Last Year: 3     Unstable Housing in the Last Year: No       Current medications:   Current Outpatient Medications   Medication    nortriptyline (PAMELOR) 10 MG Cap    ondansetron (ZOFRAN ODT) 4 MG TABLET DISPERSIBLE    ondansetron (ZOFRAN) 4 MG Tab tablet     No current facility-administered medications for this visit.       Medication Allergy:  Allergies   Allergen Reactions    Amoxicillin Hives    Penicillins Hives       Physical examination:   Vitals:    04/02/24 0808   BP: 100/70   BP Location: Left arm   Patient Position: Sitting   BP Cuff Size: Adult   Pulse: 75   Resp: 14   Temp: 36.9 °C (98.5 °F)   TempSrc: Temporal   SpO2:  "99%   Weight: 58.2 kg (128 lb 4.9 oz)   Height: 1.778 m (5' 10\")     Neurological Exam  Mental Status  Awake and alert. Speech is normal. Language is fluent with no aphasia.    Cranial Nerves  CN III, IV, VI: Extraocular movements intact bilaterally. No nystagmus. Normal smooth pursuit.  CN V:  Right: Facial sensation is normal.  Left: Facial sensation is normal on the left.  CN VII:  Right: There is no facial weakness.  Left: There is no facial weakness.    Motor   Strength is 5/5 throughout all four extremities.    Sensory  Light touch is normal in upper and lower extremities.     Coordination  Right: Finger-to-nose normal.Left: Finger-to-nose normal.    Gait  Casual gait is normal including stance, stride, and arm swing. Normal tandem gait.    CT HEAD  FINDINGS:  No acute intracranial hemorrhage is seen.     There is no midline shift.     Ventricles are normal in size and configuration.     Gray white junction differentiation is distinct.     Visualized paranasal sinuses and mastoid air cells are clear.     No acute calvarium abnormality is noted.     IMPRESSION:     No acute intracranial abnormality is identified.      ASSESSMENT AND PLAN:  Problem List Items Addressed This Visit       Post concussion syndrome     Other Visit Diagnoses       Episodic migraine        Relevant Medications    nortriptyline (PAMELOR) 10 MG Cap            1. Post concussion syndrome    2. Episodic migraine  - nortriptyline (PAMELOR) 10 MG Cap; Take 1 Capsule by mouth every evening.  Dispense: 30 Capsule; Refill: 2    28-year-old male who has been having headaches, concentration difficulties following a snowboarding collision, where his head fell backwards against the ground.  CT scan was normal.  I have counseled him on the nature of the postconcussion syndrome.  He has been counseled that the symptoms are part of the postconcussion syndrome that may take months to resolve.  He has already been 3 months since his accident.  I have " prescribed nortriptyline for treatment of his frequent migraine headache.  He will follow-up in 3 months, when we can attempt to taper this treatment.    FOLLOW-UP:   Return in about 3 months (around 7/2/2024).    Total time spent for the day for this patient unrelated to procedure time is: 30 minutes. I spent 16 minutes in face to face time and I spent 8 minutes pre-charting and 6 minutes in post-visit documentation.      RADHAMES [facility].  [facility]

## 2024-04-02 NOTE — PATIENT INSTRUCTIONS
nortriptyline (PAMELOR) 10 MG Cap [[id number]]    Order Details  Dose: 10 mg Route: Oral Frequency: NIGHTLY   Dispense Quantity: 30 Capsule Refills: 2          Sig: Take 1 Capsule by mouth every evening.

## 2024-04-05 ENCOUNTER — APPOINTMENT (OUTPATIENT)
Dept: PHYSICAL THERAPY | Facility: REHABILITATION | Age: 29
End: 2024-04-05
Attending: INTERNAL MEDICINE
Payer: COMMERCIAL

## 2024-04-12 ENCOUNTER — APPOINTMENT (OUTPATIENT)
Dept: PHYSICAL THERAPY | Facility: REHABILITATION | Age: 29
End: 2024-04-12
Attending: INTERNAL MEDICINE
Payer: COMMERCIAL

## 2024-04-15 ENCOUNTER — HOSPITAL ENCOUNTER (OUTPATIENT)
Dept: RADIOLOGY | Facility: MEDICAL CENTER | Age: 29
End: 2024-04-15
Payer: COMMERCIAL

## 2024-04-15 ENCOUNTER — OFFICE VISIT (OUTPATIENT)
Dept: URGENT CARE | Facility: PHYSICIAN GROUP | Age: 29
End: 2024-04-15
Payer: COMMERCIAL

## 2024-04-15 VITALS
WEIGHT: 128 LBS | HEIGHT: 70 IN | RESPIRATION RATE: 19 BRPM | TEMPERATURE: 98.1 F | SYSTOLIC BLOOD PRESSURE: 108 MMHG | HEART RATE: 92 BPM | DIASTOLIC BLOOD PRESSURE: 70 MMHG | BODY MASS INDEX: 18.32 KG/M2 | OXYGEN SATURATION: 98 %

## 2024-04-15 DIAGNOSIS — M79.641 HAND PAIN, RIGHT: ICD-10-CM

## 2024-04-15 PROCEDURE — 99214 OFFICE O/P EST MOD 30 MIN: CPT

## 2024-04-15 PROCEDURE — 3078F DIAST BP <80 MM HG: CPT

## 2024-04-15 PROCEDURE — 73130 X-RAY EXAM OF HAND: CPT | Mod: RT

## 2024-04-15 PROCEDURE — 3074F SYST BP LT 130 MM HG: CPT

## 2024-04-15 ASSESSMENT — ENCOUNTER SYMPTOMS
FEVER: 0
CHILLS: 0
FALLS: 0

## 2024-04-15 ASSESSMENT — FIBROSIS 4 INDEX: FIB4 SCORE: 0.51

## 2024-04-15 NOTE — LETTER
April 15, 2024    To Whom It May Concern:         This is confirmation that Darrell Elzbieta Leoniearvind Fox attended his scheduled appointment with JEFE Jim on 4/15/24. Thank you for making accommodations.          If you have any questions please do not hesitate to call me at the phone number listed below.    Sincerely,          PACHECO Jim.  [phone]

## 2024-04-15 NOTE — PROGRESS NOTES
CHIEF COMPLAINT  Chief Complaint   Patient presents with    Finger Pain     Since yesterday R thumb, pt states popped knuckle and now in pain      Subjective:   Darrell Fox is a 28 y.o. male who presents to urgent care with complaints of finger pain since yesterday.  Patient reports pain to his right thumb.  He reports a popping in his knuckles and has persistent pain to his right thumb.  He denies any numbness or tingling.  He denies any loss of sense sensation or strength.  Patient does report loss of ROM.  He reports use of Motrin with mild improvement in symptoms.  He denies any history of fall or direct injury.  He denies other pertinent past medical history.          Review of Systems   Constitutional:  Negative for chills and fever.   Musculoskeletal:  Positive for joint pain. Negative for falls.       PAST MEDICAL HISTORY  Patient Active Problem List    Diagnosis Date Noted    Post concussion syndrome 04/02/2024    Vitamin D deficiency 01/03/2024    Plica of knee, right 06/15/2023    Impingement syndrome involving patellar fat pad of right knee 06/15/2023    Chronic pain of right knee 05/11/2023    Allergies 05/11/2023    Prediabetes 08/25/2022    Anxiety 07/14/2022    Pipe smoker 07/14/2022       SURGICAL HISTORY   has a past surgical history that includes knee scope,part synovect (Right, 7/5/2023).    ALLERGIES  Allergies   Allergen Reactions    Amoxicillin Hives    Penicillins Hives       CURRENT MEDICATIONS  Home Medications       Reviewed by Louis Person'nilo (Medical Assistant) on 04/15/24 at 0820  Med List Status: <None>     Medication Last Dose Status   nortriptyline (PAMELOR) 10 MG Cap Taking Active   ondansetron (ZOFRAN ODT) 4 MG TABLET DISPERSIBLE Taking Active   ondansetron (ZOFRAN) 4 MG Tab tablet  Active                    SOCIAL HISTORY  Social History     Tobacco Use    Smoking status: Every Day     Types: Pipe    Smokeless tobacco: Never   Vaping Use    Vaping Use:  "Never used   Substance and Sexual Activity    Alcohol use: Not Currently    Drug use: Not Currently     Types: Cocaine    Sexual activity: Not Currently       FAMILY HISTORY  Family History   Problem Relation Age of Onset    Hypertension Mother     Cancer Father     Diabetes Paternal Grandmother          Medications, Allergies, and current problem list reviewed today in Epic.     Objective:     /70   Pulse 92   Temp 36.7 °C (98.1 °F) (Temporal)   Resp 19   Ht 1.778 m (5' 10\")   Wt 58.1 kg (128 lb)   SpO2 98%     Physical Exam  Vitals reviewed.   Constitutional:       General: He is not in acute distress.     Appearance: Normal appearance. He is normal weight. He is not ill-appearing or toxic-appearing.   HENT:      Head: Normocephalic.   Cardiovascular:      Rate and Rhythm: Normal rate and regular rhythm.      Pulses: Normal pulses.      Heart sounds: Normal heart sounds.   Pulmonary:      Effort: Pulmonary effort is normal. No respiratory distress.      Breath sounds: Normal breath sounds.   Musculoskeletal:         General: Swelling present. No deformity.      Right hand: Swelling and tenderness present. No deformity, lacerations or bony tenderness. Decreased range of motion. Normal strength. Normal sensation. There is no disruption of two-point discrimination. Normal capillary refill. Normal pulse.      Cervical back: Normal range of motion and neck supple. No rigidity.      Comments: Mild swelling to  CMC joint.    Lymphadenopathy:      Cervical: No cervical adenopathy.   Skin:     General: Skin is warm.      Capillary Refill: Capillary refill takes less than 2 seconds.   Neurological:      General: No focal deficit present.      Mental Status: He is alert.   Psychiatric:         Mood and Affect: Mood normal.       RADIOLOGY RESULTS   DX-HAND 3+ RIGHT    Result Date: 4/15/2024  4/15/2024 9:04 AM HISTORY/REASON FOR EXAM:  Pain following trauma. TECHNIQUE/EXAM DESCRIPTION AND NUMBER OF VIEWS:  3 views " of the RIGHT hand. COMPARISON: None FINDINGS: BONE MINERALIZATION: Normal. JOINTS: Preserved. No erosions. FRACTURE: None. DISLOCATION: None. SOFT TISSUES: No mass.     No acute osseous abnormality.            Assessment/Plan:     Diagnosis and associated orders:     1. Hand pain, right  DX-HAND 3+ RIGHT         Comments/MDM:     Discussed reassuring physical exam findings with patient's.  He like to move forward with x-ray at this time.  Discussed radiology's findings.  No acute osseous abnormality.  Advised patient that pain is likely related to soft tissue inflammation.  Advised patient to abstain from popping his knuckles.  Instructed on use of Tylenol, Motrin and ice for alleviation of discomfort.  Red flag signs and symptoms discussed.  Instructed to return to ER or urgent care if symptoms worsen or fail to improve.         Differential diagnosis, natural history, supportive care, and indications for immediate follow-up discussed.    Advised the patient to follow-up with the primary care physician for recheck, reevaluation, and consideration of further management.    Please note that this dictation was created using voice recognition software. I have made a reasonable attempt to correct obvious errors, but I expect that there are errors of grammar and possibly content that I did not discover before finalizing the note.    This note was electronically signed by JEFE Jim

## 2024-04-19 ENCOUNTER — APPOINTMENT (OUTPATIENT)
Dept: PHYSICAL THERAPY | Facility: REHABILITATION | Age: 29
End: 2024-04-19
Attending: INTERNAL MEDICINE
Payer: COMMERCIAL

## 2024-04-26 ENCOUNTER — APPOINTMENT (OUTPATIENT)
Dept: PHYSICAL THERAPY | Facility: REHABILITATION | Age: 29
End: 2024-04-26
Attending: INTERNAL MEDICINE
Payer: COMMERCIAL

## 2024-05-03 ENCOUNTER — APPOINTMENT (OUTPATIENT)
Dept: PHYSICAL THERAPY | Facility: REHABILITATION | Age: 29
End: 2024-05-03
Attending: INTERNAL MEDICINE
Payer: COMMERCIAL

## 2024-05-22 ENCOUNTER — HOSPITAL ENCOUNTER (OUTPATIENT)
Dept: RADIOLOGY | Facility: MEDICAL CENTER | Age: 29
End: 2024-05-22
Attending: STUDENT IN AN ORGANIZED HEALTH CARE EDUCATION/TRAINING PROGRAM
Payer: COMMERCIAL

## 2024-05-22 ENCOUNTER — OFFICE VISIT (OUTPATIENT)
Dept: URGENT CARE | Facility: PHYSICIAN GROUP | Age: 29
End: 2024-05-22
Payer: COMMERCIAL

## 2024-05-22 VITALS
SYSTOLIC BLOOD PRESSURE: 122 MMHG | RESPIRATION RATE: 17 BRPM | BODY MASS INDEX: 17.61 KG/M2 | DIASTOLIC BLOOD PRESSURE: 76 MMHG | TEMPERATURE: 99 F | HEIGHT: 70 IN | WEIGHT: 123 LBS | OXYGEN SATURATION: 97 % | HEART RATE: 88 BPM

## 2024-05-22 DIAGNOSIS — M25.561 RIGHT KNEE PAIN, UNSPECIFIED CHRONICITY: ICD-10-CM

## 2024-05-22 PROCEDURE — 3078F DIAST BP <80 MM HG: CPT | Performed by: STUDENT IN AN ORGANIZED HEALTH CARE EDUCATION/TRAINING PROGRAM

## 2024-05-22 PROCEDURE — 3074F SYST BP LT 130 MM HG: CPT | Performed by: STUDENT IN AN ORGANIZED HEALTH CARE EDUCATION/TRAINING PROGRAM

## 2024-05-22 PROCEDURE — 99214 OFFICE O/P EST MOD 30 MIN: CPT | Performed by: STUDENT IN AN ORGANIZED HEALTH CARE EDUCATION/TRAINING PROGRAM

## 2024-05-22 RX ORDER — IBUPROFEN 600 MG/1
600 TABLET ORAL EVERY 6 HOURS PRN
Qty: 20 TABLET | Refills: 0 | Status: SHIPPED | OUTPATIENT
Start: 2024-05-22

## 2024-05-22 RX ORDER — LIDOCAINE 50 MG/G
1 PATCH TOPICAL EVERY 24 HOURS
Qty: 10 PATCH | Refills: 0 | Status: SHIPPED | OUTPATIENT
Start: 2024-05-22

## 2024-05-22 ASSESSMENT — ENCOUNTER SYMPTOMS
WEAKNESS: 0
SENSORY CHANGE: 0
CHILLS: 0
TINGLING: 0
FEVER: 0

## 2024-05-22 ASSESSMENT — FIBROSIS 4 INDEX: FIB4 SCORE: 0.51

## 2024-05-22 NOTE — PROGRESS NOTES
"Subjective     Darrell Elzbieta Fox is a 28 y.o. male who presents with Knee Pain (R KNEE PAIN)            Darrell is a 28 y.o. male who presents to urgent care with right knee pain.  Patient states states he has had chronic knee pain of right knee.  Patient reports removal of his flank and his right knee.  Patient states he was experiencing less pain after surgery but over the last 4 months he has been having right knee pain that is waxing and waning in characteristic.  Certain movements exacerbate pain.  Pain is described as sharp.  Patient also feels some popping and clicking in his knee.  Knee has not given out or buckled on him.  No recent injury or trauma.  No swelling, bruising or redness.  Patient has not taken any OTC medications to assist with pain.    Knee Pain  Chronicity: acute on chronic. The problem has been waxing and waning. Pertinent negatives include no chills, fever or weakness.       Review of Systems   Constitutional:  Negative for chills and fever.   Musculoskeletal:  Positive for joint pain.   Neurological:  Negative for tingling, sensory change and weakness.   All other systems reviewed and are negative.             Objective     /76   Pulse 88   Temp 37.2 °C (99 °F) (Temporal)   Resp 17   Ht 1.766 m (5' 9.53\")   Wt 55.8 kg (123 lb)   SpO2 97%   BMI 17.89 kg/m²      Physical Exam  Vitals reviewed.   Constitutional:       Appearance: Normal appearance.   HENT:      Head: Normocephalic and atraumatic.   Cardiovascular:      Rate and Rhythm: Normal rate.   Pulmonary:      Effort: Pulmonary effort is normal.   Musculoskeletal:      Right knee: No swelling or deformity. Normal range of motion. Tenderness present over the medial joint line. No lateral joint line tenderness. Normal pulse.   Skin:     General: Skin is warm and dry.      Capillary Refill: Capillary refill takes less than 2 seconds.   Neurological:      General: No focal deficit present.      Mental Status: He is alert. "      Gait: Gait is intact.                    RADIOLOGY RESULTS   DX-KNEE COMPLETE 4+ RIGHT    Result Date: 5/22/2024 5/22/2024 12:47 PM HISTORY/REASON FOR EXAM:  Right knee pain prior right knee surgery TECHNIQUE/EXAM DESCRIPTION AND NUMBER OF VIEWS:  4 views of the RIGHT knee. COMPARISON: 05/11/2023 FINDINGS: Bone density is normal.  There is no evidence of fracture or dislocation.  There is no evidence of arthropathy.  There is no joint effusion.     No evidence of fracture or dislocation.                   Assessment & Plan        1. Right knee pain, unspecified chronicity  - Acute on chronis right knee pain.  - DX-KNEE COMPLETE 4+ RIGHT; Future  - IMPRESSION: No evidence of fracture or dislocation.  - PMH significant for chronic pain of right knee pain, impingement syndrome involving patellar fat pad of right knee, plica of right knee.  - Referral to Sports Medicine  - Knee Brace  - lidocaine (LIDODERM) 5 % Patch; Place 1 Patch on the skin every 24 hours.  Dispense: 10 Patch; Refill: 0  - ibuprofen (MOTRIN) 600 MG Tab; Take 1 Tablet by mouth every 6 hours as needed for Moderate Pain.  Dispense: 20 Tablet; Refill: 0       Differential diagnoses, supportive care measures and indications for immediate follow-up discussed with patient. Pathogenesis of diagnosis discussed including typical length and natural progression.      Instructed to return to urgent care or nearest emergency department if symptoms fail to improve, for any change in condition, further concerns, or new concerning symptoms.    Patient states understanding and agrees with the plan of care and discharge instructions.

## 2024-05-22 NOTE — LETTER
May 22, 2024    To Whom It May Concern:         This is confirmation that Darrell Fox attended his scheduled appointment with Carmela Vivar P.A.-C. on 5/22/24.  Please excuse work absences today for medical reasons. Darrell can return to work on 5/23/2024 without restrictions.      Sincerely,    Carmela Vivar P.A.-C.  [phone]

## 2024-05-23 DIAGNOSIS — G43.909 EPISODIC MIGRAINE: ICD-10-CM

## 2024-05-24 RX ORDER — NORTRIPTYLINE HYDROCHLORIDE 10 MG/1
10 CAPSULE ORAL EVERY EVENING
Qty: 90 CAPSULE | Refills: 1 | OUTPATIENT
Start: 2024-05-24

## 2024-05-28 RX ORDER — NORTRIPTYLINE HYDROCHLORIDE 10 MG/1
10 CAPSULE ORAL NIGHTLY
Qty: 30 CAPSULE | Refills: 2 | Status: SHIPPED | OUTPATIENT
Start: 2024-05-28

## 2024-06-06 ENCOUNTER — OFFICE VISIT (OUTPATIENT)
Dept: MEDICAL GROUP | Facility: PHYSICIAN GROUP | Age: 29
End: 2024-06-06
Payer: COMMERCIAL

## 2024-06-06 VITALS
HEART RATE: 70 BPM | DIASTOLIC BLOOD PRESSURE: 74 MMHG | OXYGEN SATURATION: 98 % | WEIGHT: 123.2 LBS | RESPIRATION RATE: 16 BRPM | HEIGHT: 70 IN | TEMPERATURE: 98.1 F | BODY MASS INDEX: 17.64 KG/M2 | SYSTOLIC BLOOD PRESSURE: 96 MMHG

## 2024-06-06 DIAGNOSIS — R73.03 PREDIABETES: Chronic | ICD-10-CM

## 2024-06-06 DIAGNOSIS — R53.83 FATIGUE, UNSPECIFIED TYPE: ICD-10-CM

## 2024-06-06 DIAGNOSIS — E55.9 VITAMIN D DEFICIENCY: Chronic | ICD-10-CM

## 2024-06-06 PROCEDURE — 3078F DIAST BP <80 MM HG: CPT | Performed by: PHYSICIAN ASSISTANT

## 2024-06-06 PROCEDURE — 99214 OFFICE O/P EST MOD 30 MIN: CPT | Performed by: PHYSICIAN ASSISTANT

## 2024-06-06 PROCEDURE — 3074F SYST BP LT 130 MM HG: CPT | Performed by: PHYSICIAN ASSISTANT

## 2024-06-06 ASSESSMENT — ENCOUNTER SYMPTOMS
SHORTNESS OF BREATH: 0
CHILLS: 0
FEVER: 0

## 2024-06-06 ASSESSMENT — FIBROSIS 4 INDEX: FIB4 SCORE: 0.51

## 2024-06-06 NOTE — PROGRESS NOTES
"SUBJECTIVE:     CC: lab follow up     HPI:   Darrell Ruff, patient of Dr. Mckinnon, presents today with the following:    ASSESSMENT & PLAN by Problem:       Problem List Items Addressed This Visit       Prediabetes (Chronic)     Chronic, uncontrolled.  Mildly elevated A1C 5.9%.  Increase plant-based foods, decrease animal-based foods.  Increase daily activity, aiming for 30-45 minutes brisk exercise daily.    Repeat in 1 year.         Relevant Orders    HEMOGLOBIN A1C    Vitamin D deficiency (Chronic)     Chronic, uncontrolled.  Not currently supplementing.  Recommend starting 2000 IU daily.  Repeat labs in 1 year.         Relevant Orders    VITAMIN D,25 HYDROXY (DEFICIENCY)     Other Visit Diagnoses       Fatigue, unspecified type        Relevant Orders    CBC WITH DIFFERENTIAL    Comp Metabolic Panel    TSH WITH REFLEX TO FT4          Follow up with primary care in one year for lab follow up, sooner as needed.    Return in about 1 year (around 6/6/2025), or if symptoms worsen or fail to improve.        Healthcare Maintenance:      HPI:     Problem   Vitamin D Deficiency    Chronic, uncontrolled.  Not currently supplementing.  Recommend starting 2000 IU daily.  Repeat labs in 1 year.     Prediabetes              ROS:  Review of Systems   Constitutional:  Negative for chills and fever.   Respiratory:  Negative for shortness of breath.    Cardiovascular:  Negative for chest pain.       OBJECTIVE:     Exam:  BP 96/74 (BP Location: Left arm, Patient Position: Sitting, BP Cuff Size: Adult)   Pulse 70   Temp 36.7 °C (98.1 °F) (Temporal)   Resp 16   Ht 1.778 m (5' 10\")   Wt 55.9 kg (123 lb 3.2 oz)   SpO2 98%   BMI 17.68 kg/m²  Body mass index is 17.68 kg/m².    Physical Exam  Vitals reviewed.   Constitutional:       General: He is not in acute distress.     Appearance: Normal appearance.   Pulmonary:      Effort: Pulmonary effort is normal.   Neurological:      General: No focal deficit present.      Mental Status: He " is alert.   Psychiatric:         Mood and Affect: Mood normal.         Behavior: Behavior normal.         Judgment: Judgment normal.           CHART REVIEW:     Labs:                   Please note that this dictation was created using voice recognition software. I have made every reasonable attempt to correct obvious errors, but I expect that there are errors of grammar and possibly content that I did not discover before finalizing the note.

## 2024-06-06 NOTE — ASSESSMENT & PLAN NOTE
Chronic, uncontrolled.  Not currently supplementing.  Recommend starting 2000 IU daily.  Repeat labs in 1 year.

## 2024-06-06 NOTE — ASSESSMENT & PLAN NOTE
Chronic, uncontrolled.  Mildly elevated A1C 5.9%.  Increase plant-based foods, decrease animal-based foods.  Increase daily activity, aiming for 30-45 minutes brisk exercise daily.    Repeat in 1 year.

## 2024-06-06 NOTE — LETTER
June 6, 2024    To Whom It May Concern:         This is confirmation that Darrell Elzbieta Barton Fox attended his scheduled appointment with Shraddha Gates P.A.-C. on 6/06/24. Please excuse him for missing work today, 6/6/2024.         If you have any questions please do not hesitate to call me at the phone number listed below.    Sincerely,          Shraddha Gates P.A.-C.  [phone]

## 2024-06-17 ENCOUNTER — OFFICE VISIT (OUTPATIENT)
Dept: URGENT CARE | Facility: PHYSICIAN GROUP | Age: 29
End: 2024-06-17
Payer: COMMERCIAL

## 2024-06-17 VITALS
TEMPERATURE: 98.5 F | BODY MASS INDEX: 17.6 KG/M2 | HEART RATE: 96 BPM | DIASTOLIC BLOOD PRESSURE: 64 MMHG | HEIGHT: 70 IN | RESPIRATION RATE: 18 BRPM | OXYGEN SATURATION: 97 % | SYSTOLIC BLOOD PRESSURE: 100 MMHG | WEIGHT: 122.9 LBS

## 2024-06-17 DIAGNOSIS — R25.2 TRISMUS: ICD-10-CM

## 2024-06-17 DIAGNOSIS — M26.69 TMJ LOCKING: ICD-10-CM

## 2024-06-17 PROCEDURE — 3074F SYST BP LT 130 MM HG: CPT

## 2024-06-17 PROCEDURE — 99213 OFFICE O/P EST LOW 20 MIN: CPT

## 2024-06-17 PROCEDURE — 3078F DIAST BP <80 MM HG: CPT

## 2024-06-17 RX ORDER — METHYLPREDNISOLONE 4 MG/1
TABLET ORAL
Qty: 21 TABLET | Refills: 0 | Status: SHIPPED | OUTPATIENT
Start: 2024-06-17

## 2024-06-17 ASSESSMENT — ENCOUNTER SYMPTOMS
FEVER: 0
FALLS: 0
SORE THROAT: 0

## 2024-06-17 ASSESSMENT — FIBROSIS 4 INDEX: FIB4 SCORE: 0.51

## 2024-06-17 NOTE — PROGRESS NOTES
Subjective:     CHIEF COMPLAINT  Chief Complaint   Patient presents with    Jaw Pain     Lock jaw, can't open mouth  X 1 day        HPI  Darrell Fox is a very pleasant 28 y.o. male who presents with jaw pain with reduced mobility of the jaw that started today.  He denies any injuries or events preceding the onset of his pain.  He spontaneously woke up with it today.  He denies a known history of TMJ.  He has tried managing his symptoms with Tylenol without improvement in symptoms.  He reports pain when he tries to fully open his jaw.  He denies a sore throat.  He has not had any fevers.  He is up-to-date on tetanus.    REVIEW OF SYSTEMS  Review of Systems   Constitutional:  Negative for fever and malaise/fatigue.   HENT:  Negative for sore throat.    Musculoskeletal:  Negative for falls.       PAST MEDICAL HISTORY  Patient Active Problem List    Diagnosis Date Noted    Post concussion syndrome 04/02/2024    Vitamin D deficiency 01/03/2024    Plica of knee, right 06/15/2023    Impingement syndrome involving patellar fat pad of right knee 06/15/2023    Chronic pain of right knee 05/11/2023    Allergies 05/11/2023    Prediabetes 08/25/2022    Anxiety 07/14/2022    Pipe smoker 07/14/2022       SURGICAL HISTORY   has a past surgical history that includes knee scope,part synovect (Right, 7/5/2023).    ALLERGIES  Allergies   Allergen Reactions    Amoxicillin Hives    Penicillins Hives       CURRENT MEDICATIONS  Home Medications       Reviewed by Jyoti Blackburn P.A.-C. (Physician Assistant) on 06/17/24 at 0928  Med List Status: <None>     Medication Last Dose Status   ibuprofen (MOTRIN) 600 MG Tab PRN Active   lidocaine (LIDODERM) 5 % Patch PRN Active   nortriptyline (PAMELOR) 10 MG Cap PRN Active   ondansetron (ZOFRAN) 4 MG Tab tablet  Active                    SOCIAL HISTORY  Social History     Tobacco Use    Smoking status: Every Day     Types: Pipe    Smokeless tobacco: Never   Vaping Use    Vaping  "status: Never Used   Substance and Sexual Activity    Alcohol use: Not Currently    Drug use: Not Currently     Types: Cocaine    Sexual activity: Not Currently       FAMILY HISTORY  Family History   Problem Relation Age of Onset    Hypertension Mother     Cancer Father     Diabetes Paternal Grandmother           Objective:     VITAL SIGNS: /64   Pulse 96   Temp 36.9 °C (98.5 °F) (Temporal)   Resp 18   Ht 1.778 m (5' 10\")   Wt 55.7 kg (122 lb 14.4 oz)   SpO2 97%   BMI 17.63 kg/m²     PHYSICAL EXAM  Physical Exam  Vitals reviewed.   Constitutional:       General: He is not in acute distress.     Appearance: Normal appearance. He is not ill-appearing or toxic-appearing.   HENT:      Head: Normocephalic and atraumatic.      Jaw: Trismus and pain on movement present. No tenderness, swelling or malocclusion.        Comments: Pain in TMJ joint elicited upon opening of jaw.  Minimal tenderness to palpation.  No popping or grinding with opening and closing of jaw in temporomandibular joint.     Mouth/Throat:      Mouth: Mucous membranes are moist.      Pharynx: Oropharynx is clear. Uvula midline. No pharyngeal swelling.      Tonsils: No tonsillar abscesses.   Eyes:      Conjunctiva/sclera: Conjunctivae normal.      Pupils: Pupils are equal, round, and reactive to light.   Pulmonary:      Effort: Pulmonary effort is normal. No respiratory distress.   Skin:     General: Skin is warm and dry.   Neurological:      General: No focal deficit present.      Mental Status: He is alert and oriented to person, place, and time.   Psychiatric:         Mood and Affect: Mood normal.         Assessment/Plan:     1. Trismus  - methylPREDNISolone (MEDROL DOSEPAK) 4 MG Tablet Therapy Pack; Follow schedule on package instructions.  Dispense: 21 Tablet; Refill: 0  - Referral to [facility]    2. TMJ locking  - methylPREDNISolone (MEDROL DOSEPAK) 4 MG Tablet Therapy Pack; Follow schedule on package instructions.  Dispense: 21 " Tablet; Refill: 0  - Referral to [facility]  -Tylenol over-the-counter  -Soft foods  -Follow-up with TMJ clinic  -Return to clinic if symptoms worsen or fail to resolve    MDM/Comments:  Patient has stable vital signs and is non-toxic appearing.  Patient initiated on a Medrol Dosepak for trismus/TMJ locking.  Referral placed to TMJ clinic.  No evidence of peritonsillar abscess or infection.  Discussed supportive care with hydration, rest, Tylenol as needed. Patient demonstrated understanding of treatment plan at this time and will RTC if symptoms worsen or fail to resolve.     Differential diagnosis, natural history, supportive care, and indications for immediate follow-up discussed. All questions answered. Patient agrees with the plan of care.    Follow-up as needed if symptoms worsen or fail to improve to PCP, Urgent care or Emergency Room.    I have personally reviewed prior external notes and test results pertinent to today's visit.  I have independently reviewed and interpreted all diagnostics ordered during this urgent care acute visit.   Discussed management options (risks,benefits, and alternatives to treatment). Pt expresses understanding and the treatment plan was agreed upon. Questions were encouraged and answered to pt's satisfaction.    Please note that this dictation was created using voice recognition software. I have made a reasonable attempt to correct obvious errors, but I expect that there are errors of grammar and possibly content that I did not discover before finalizing the note.

## 2024-08-07 ENCOUNTER — APPOINTMENT (OUTPATIENT)
Dept: MEDICAL GROUP | Facility: PHYSICIAN GROUP | Age: 29
End: 2024-08-07

## 2024-08-08 ENCOUNTER — OFFICE VISIT (OUTPATIENT)
Dept: MEDICAL GROUP | Facility: PHYSICIAN GROUP | Age: 29
End: 2024-08-08

## 2024-08-08 VITALS
HEIGHT: 69 IN | HEART RATE: 64 BPM | BODY MASS INDEX: 17.83 KG/M2 | DIASTOLIC BLOOD PRESSURE: 60 MMHG | SYSTOLIC BLOOD PRESSURE: 100 MMHG | OXYGEN SATURATION: 98 % | TEMPERATURE: 98.3 F | RESPIRATION RATE: 16 BRPM | WEIGHT: 120.38 LBS

## 2024-08-08 DIAGNOSIS — E55.9 VITAMIN D DEFICIENCY: Chronic | ICD-10-CM

## 2024-08-08 DIAGNOSIS — F51.01 PRIMARY INSOMNIA: ICD-10-CM

## 2024-08-08 DIAGNOSIS — R53.83 FATIGUE, UNSPECIFIED TYPE: ICD-10-CM

## 2024-08-08 DIAGNOSIS — Z02.89 ENCOUNTER FOR COMPLETION OF FORM WITH PATIENT: ICD-10-CM

## 2024-08-08 PROBLEM — G47.00 INSOMNIA: Status: ACTIVE | Noted: 2024-08-08

## 2024-08-08 PROCEDURE — 99214 OFFICE O/P EST MOD 30 MIN: CPT

## 2024-08-08 PROCEDURE — 3078F DIAST BP <80 MM HG: CPT

## 2024-08-08 PROCEDURE — 3074F SYST BP LT 130 MM HG: CPT

## 2024-08-08 RX ORDER — ERGOCALCIFEROL 1.25 MG/1
50000 CAPSULE ORAL
Qty: 12 CAPSULE | Refills: 3 | Status: SHIPPED | OUTPATIENT
Start: 2024-08-08

## 2024-08-08 ASSESSMENT — FIBROSIS 4 INDEX: FIB4 SCORE: 0.51

## 2024-08-08 NOTE — LETTER
August 8, 2024    To Whom It May Concern:         This is confirmation that Darrell Fox attended his scheduled appointment with JEFE Estrada on 8/08/24. At this point due to health concerns and difficulty with sleep, please switch him to day shift for the time being until he can follow up with his neurologist on 9/11/2024 for further instruction.         If you have any questions please do not hesitate to call me at the phone number listed below.    Sincerely,          Mony Hickey A.P.R.N.  [phone]                   · Patient presented to the ED with complaints of worsening fatigue, cough, shortness of breath  · Patient positive for covid-19  · Chest x-ray (7/26/21): Right lower lobe opacity, indicative of pneumonia  In the setting of clinically suspected/proven COVID-19, this plain film appearance while nonspecific, can be seen in cases of viral pneumonia such as COVID-19  · Initial inflammatory markers noted  · D-Dimer 0 62, CRP 51 8, Ferritin 202  · Initial cardiac markers noted  · Trop <0 02,   · Mild treatment plan  · Vitamin-C, D, zinc this is day 3 of 7  · Remdesivir this is day 3 of 5  · Dexamethasone IV - Day 3/10  · Of note, D-Dimer as of 7/27 was 0 41, Ferritin 188  · Procalcitonin x 2 negative; patient is afebrile, patient with leukopenia however, remaining stable  Antibiotics discontinued as of 7/27  Observe off    · Self prone as tolerated  · Provide oxygen supplementation and wean as tolerated - saturating around 94% average on 2 L nasal cannula  · Monitor clinically, temperature curve - afebrile over the last 24 hours

## 2024-08-08 NOTE — PROGRESS NOTES
"Verbal consent was acquired by the patient to use [employer] ambient listening note generation during this visit Yes     Subjective:     Chief Complaint   Patient presents with    Letter for School/Work     History of Present Illness  The patient is an established 28-year-old male of Dr. Jonathan Mckinnon, who presents for evaluation of fatigue and issues with falling asleep at work during night.    He has been experiencing fatigue and falling asleep at work at night for the past 4 weeks. His sleep duration is limited to 2 hours per day due to his night shift work, which involves 3 to 4 days a week. Despite sleeping with blackout curtains, he does not use cell phones before bedtime. Despite trying various sleep aids such as melatonin, ZzzQuil, NyQuil, and Unisom, none have provided relief.     He is seeking a doctor's note to avoid losing his job. He denies snoring, waking up gasping for air, experiencing shortness of breath, headaches, or dry mouth. Despite attempts to modify his day shift, he wakes up whenever he wakes up. He is currently taking nortriptyline in the morning after work per his neurologist Dr Dickens.    He has a history of chronic fatigue and vitamin D deficiency, but is not currently taking a vitamin D supplement.    Supplemental Information  He injured his head while snowboarding and hit his head on ice. He is seeing Dr. Dickens for that. He has an appointment with Dr. Dickens on 09/11/2024. He is no longer taking Paxil.    SOCIAL HISTORY  He is still a smoker.    Allergies: Amoxicillin and Penicillins  ROS PER HPI  Health Maintenance: Deferred at this time   Objective:     /60 (BP Location: Left arm, Patient Position: Sitting, BP Cuff Size: Adult)   Pulse 64   Temp 36.8 °C (98.3 °F) (Temporal)   Resp 16   Ht 1.753 m (5' 9\")   Wt 54.6 kg (120 lb 6 oz)   SpO2 98%   BMI 17.78 kg/m²  Body mass index is 17.78 kg/m².     Physical Exam  Vitals reviewed.   Constitutional:       General: He is not in acute " distress.     Appearance: Normal appearance. He is not ill-appearing.   Cardiovascular:      Rate and Rhythm: Normal rate and regular rhythm.      Heart sounds: Normal heart sounds.   Pulmonary:      Effort: Pulmonary effort is normal. No respiratory distress.      Breath sounds: Normal breath sounds.   Skin:     Coloration: Skin is not jaundiced or pale.   Neurological:      General: No focal deficit present.      Mental Status: He is alert and oriented to person, place, and time.   Psychiatric:         Mood and Affect: Mood normal.         Behavior: Behavior normal.         Thought Content: Thought content normal.         Judgment: Judgment normal.        Results for orders placed or performed during the hospital encounter of 03/18/24   HEMOGLOBIN A1C   Result Value Ref Range    Glycohemoglobin 5.9 (H) 4.0 - 5.6 %    Est Avg Glucose 123 mg/dL   Basic Metabolic Panel   Result Value Ref Range    Sodium 139 135 - 145 mmol/L    Potassium 4.4 3.6 - 5.5 mmol/L    Chloride 104 96 - 112 mmol/L    Co2 25 20 - 33 mmol/L    Glucose 89 65 - 99 mg/dL    Bun 11 8 - 22 mg/dL    Creatinine 0.94 0.50 - 1.40 mg/dL    Calcium 9.2 8.5 - 10.5 mg/dL    Anion Gap 10.0 7.0 - 16.0   VITAMIN D,25 HYDROXY (DEFICIENCY)   Result Value Ref Range    25-Hydroxy   Vitamin D 25 15 (L) 30 - 100 ng/mL   ESTIMATED GFR   Result Value Ref Range    GFR (CKD-EPI) 112 >60 mL/min/1.73 m 2      Assessment and Plan:     The following treatment plan was discussed through shared decision making with the patient:    1. Primary insomnia        2. Fatigue, unspecified type        3. Vitamin D deficiency  ergocalciferol (DRISDOL) 69543 UNIT capsule      4. Encounter for completion of form with patient          Assessment & Plan  A prescription for ergocalciferol once a week for a year has been provided for his vitamin D deficiency. His lab work will be rechecked in about 3 months.     A note has been provided until he sees Dr. Dickens, stating that he needs to switch  to day shift. A sleep hygiene and habit handout has been provided.    Return in about 3 months (around 11/8/2024) for Labs with Dr Mckinnon.           Please note that this note was created using dictation with voice recognition software. I have made every reasonable attempt to correct obvious errors, but I expect that there are errors of grammar and possibly content that I did not discover before finalizing the note.    BALBIR Estrada  [facility]  [facility]

## 2024-09-11 ENCOUNTER — OFFICE VISIT (OUTPATIENT)
Dept: NEUROLOGY | Facility: MEDICAL CENTER | Age: 29
End: 2024-09-11
Attending: PSYCHIATRY & NEUROLOGY

## 2024-09-11 VITALS
HEIGHT: 70 IN | DIASTOLIC BLOOD PRESSURE: 60 MMHG | WEIGHT: 129.19 LBS | OXYGEN SATURATION: 96 % | BODY MASS INDEX: 18.5 KG/M2 | HEART RATE: 76 BPM | TEMPERATURE: 97.1 F | SYSTOLIC BLOOD PRESSURE: 108 MMHG

## 2024-09-11 DIAGNOSIS — G43.909 EPISODIC MIGRAINE: ICD-10-CM

## 2024-09-11 PROCEDURE — 99214 OFFICE O/P EST MOD 30 MIN: CPT | Performed by: PSYCHIATRY & NEUROLOGY

## 2024-09-11 PROCEDURE — 3074F SYST BP LT 130 MM HG: CPT | Performed by: PSYCHIATRY & NEUROLOGY

## 2024-09-11 PROCEDURE — 3078F DIAST BP <80 MM HG: CPT | Performed by: PSYCHIATRY & NEUROLOGY

## 2024-09-11 PROCEDURE — 99211 OFF/OP EST MAY X REQ PHY/QHP: CPT | Performed by: PSYCHIATRY & NEUROLOGY

## 2024-09-11 RX ORDER — NORTRIPTYLINE HCL 10 MG
10 CAPSULE ORAL NIGHTLY
Qty: 90 CAPSULE | Refills: 2 | Status: SHIPPED | OUTPATIENT
Start: 2024-09-11

## 2024-09-11 ASSESSMENT — PATIENT HEALTH QUESTIONNAIRE - PHQ9
SUM OF ALL RESPONSES TO PHQ QUESTIONS 1-9: 8
CLINICAL INTERPRETATION OF PHQ2 SCORE: 2
5. POOR APPETITE OR OVEREATING: 0 - NOT AT ALL

## 2024-09-11 NOTE — PROGRESS NOTES
Chief Complaint   Patient presents with    Follow-Up     Post concussion syndrome         History of present illness:  Darrell Fox 28 y.o. male with HISTORY OF snowboard accident in Dec 2023. He fell backwards and hit his head. The next day, he became nauseated from light shining in his eye. He presented to the ED for this.   Since then, he has had poor memory and his friends have to remind him of plans. He is forgetting that he clocked in to work.    He is having headaches twice per week, lasting for 1 hour. This week, he had 3 headaches.   He is sleeping well.    He works at [employer] and works nights. He needs a note from me to come off of graveyard shifts due to worsening migraines. He has had trouble sleeping during the day and the lack of sleep has been worsening his migraines.   He is having 1 migraine per day or every other day. Previously, before working nights, he has having 1 migraine every week or other week. He is on ibuprofen 250mg tabs and 4-5 tabs per day. This has been he for started seeing me in 2024, following a snowboard accident where he used for the last 1-2 months.     Past medical history:   No past medical history on file.    Past surgical history:   Past Surgical History:   Procedure Laterality Date    PB KNEE SCOPE,PART SYNOVECT Right 7/5/2023    Procedure: RIGHT KNEE ARTHROSCOPY WITH FAT PAD DEBRIDEMENT, RIGHT PLICA EXCISION, REPAIRS AS INDICATED;  Surgeon: Teagan Salvador M.D.;  Location: [facility];  Service: Orthopedics       Family history:   Family History   Problem Relation Age of Onset    Hypertension Mother     Cancer Father     Diabetes Paternal Grandmother        Social history:   Social History     Socioeconomic History    Marital status: Single     Spouse name: Not on file    Number of children: Not on file    Years of education: Not on file    Highest education level: 12th grade   Occupational History    Not on file   Tobacco Use     Smoking status: Every Day     Types: Pipe    Smokeless tobacco: Never   Vaping Use    Vaping status: Never Used   Substance and Sexual Activity    Alcohol use: Not Currently    Drug use: Not Currently    Sexual activity: Not Currently   Other Topics Concern    Not on file   Social History Narrative    Not on file     Social Determinants of Health     Financial Resource Strain: Medium Risk (6/20/2022)    Overall Financial Resource Strain (CARDIA)     Difficulty of Paying Living Expenses: Somewhat hard   Food Insecurity: Food Insecurity Present (6/20/2022)    Hunger Vital Sign     Worried About Running Out of Food in the Last Year: Sometimes true     Ran Out of Food in the Last Year: Sometimes true   Transportation Needs: No Transportation Needs (6/20/2022)    PRAPARE - Transportation     Lack of Transportation (Medical): No     Lack of Transportation (Non-Medical): No   Physical Activity: Sufficiently Active (6/20/2022)    Exercise Vital Sign     Days of Exercise per Week: 7 days     Minutes of Exercise per Session: 150+ min   Stress: Stress Concern Present (6/20/2022)    [place] [employer] - Occupational Stress Questionnaire     Feeling of Stress : Rather much   Social Connections: Socially Isolated (6/20/2022)    Social Connection and Isolation Panel [NHANES]     Frequency of Communication with Friends and Family: Once a week     Frequency of Social Gatherings with Friends and Family: Twice a week     Attends Alevism Services: Never     Active Member of Clubs or Organizations: No     Attends Club or Organization Meetings: Never     Marital Status: Never    Intimate Partner Violence: Not on file   Housing Stability: High Risk (6/20/2022)    Housing Stability Vital Sign     Unable to Pay for Housing in the Last Year: Yes     Number of Places Lived in the Last Year: 3     Unstable Housing in the Last Year: No       Current medications:   Current Outpatient Medications   Medication     "nortriptyline (PAMELOR) 10 MG Cap    ergocalciferol (DRISDOL) 27328 UNIT capsule    lidocaine (LIDODERM) 5 % Patch    ibuprofen (MOTRIN) 600 MG Tab    ondansetron (ZOFRAN) 4 MG Tab tablet     No current facility-administered medications for this visit.       Medication Allergy:  Allergies   Allergen Reactions    Amoxicillin Hives    Penicillins Hives       Physical examination:   Vitals:    09/11/24 1344   BP: 108/60   BP Location: Right arm   Patient Position: Sitting   BP Cuff Size: Adult   Pulse: 76   Temp: 36.2 °C (97.1 °F)   TempSrc: Temporal   SpO2: 96%   Weight: 58.6 kg (129 lb 3 oz)   Height: 1.778 m (5' 10\")     ASSESSMENT AND PLAN:  Problem List Items Addressed This Visit    None  Visit Diagnoses       Episodic migraine        Relevant Medications    nortriptyline (PAMELOR) 10 MG Cap            1. Episodic migraine  - nortriptyline (PAMELOR) 10 MG Cap; Take 1 Capsule by mouth every evening.  Dispense: 90 Capsule; Refill: 2    28-year-old male with postconcussion migraine.  He has been suffering from sleep deprivation recently, after his work was switched to night shifts.  Today, I have written a letter to his employer requesting that he be switched back to day hours only given that sleep deprivation is a well-established trigger for migraine headaches.  In addition, I have placed him back on nortriptyline.  Nortriptyline was helpful for his migraines in the past.  We will follow-up in 6 months, and if he is doing well a nortriptyline taper may be considered then.    FOLLOW-UP:   Return in about 6 months (around 3/11/2025).    Total time spent for the day for this patient unrelated to procedure time is: 13 minutes. I spent 9 minutes in face to face time and I spent 2 minutes pre-charting and 3 minutes in post-visit documentation.      Dr. Akin Dickens D.O.  [facility]  Movement Disorders Specialist    "

## 2024-09-11 NOTE — LETTER
September 11, 2024        Darrell Fox is a patient of mine that I have been seeing for migraine headaches.  He first started seeing me in 2024 following a snowboarding accident where he suffered a concussion.  He has been having frequent migraines following the concussion.  After he was placed on night shifts, his hours of sufficient sleep has dramatically dropped and he is only getting a couple of hours of sleep per day.  Due to lack of sleep, he is having worse and more frequent migraines.  He is having 1 migraine daily currently.  Prior to the sleep deprivation, he was not experiencing severe migraines, therefore would recommend that he be placed back on day shifts.  Sleep deprivation is a known trigger for worsening migraine headaches.  Please let me know if you have any questions      Sincerely,    Kenneth Dickens D.O.          Dr. Akin Dickens D.O.  [facility]

## 2025-03-11 ENCOUNTER — APPOINTMENT (OUTPATIENT)
Dept: NEUROLOGY | Facility: MEDICAL CENTER | Age: 30
End: 2025-03-11
Attending: PSYCHIATRY & NEUROLOGY

## 2025-03-12 ENCOUNTER — APPOINTMENT (OUTPATIENT)
Dept: NEUROLOGY | Facility: MEDICAL CENTER | Age: 30
End: 2025-03-12
Attending: SPECIALIST

## 2025-05-15 ENCOUNTER — OFFICE VISIT (OUTPATIENT)
Dept: URGENT CARE | Facility: PHYSICIAN GROUP | Age: 30
End: 2025-05-15
Payer: COMMERCIAL

## 2025-05-15 VITALS
HEIGHT: 69 IN | DIASTOLIC BLOOD PRESSURE: 60 MMHG | OXYGEN SATURATION: 99 % | SYSTOLIC BLOOD PRESSURE: 100 MMHG | BODY MASS INDEX: 18.84 KG/M2 | RESPIRATION RATE: 14 BRPM | HEART RATE: 98 BPM | WEIGHT: 127.2 LBS | TEMPERATURE: 98.4 F

## 2025-05-15 DIAGNOSIS — R22.0 RIGHT FACIAL SWELLING: ICD-10-CM

## 2025-05-15 DIAGNOSIS — S09.93XA FACIAL TRAUMA, INITIAL ENCOUNTER: Primary | ICD-10-CM

## 2025-05-15 DIAGNOSIS — S00.83XA HEMATOMA OF FACE, INITIAL ENCOUNTER: ICD-10-CM

## 2025-05-15 DIAGNOSIS — R25.2 TRISMUS: ICD-10-CM

## 2025-05-15 PROCEDURE — 99214 OFFICE O/P EST MOD 30 MIN: CPT | Performed by: NURSE PRACTITIONER

## 2025-05-15 PROCEDURE — 3078F DIAST BP <80 MM HG: CPT | Performed by: NURSE PRACTITIONER

## 2025-05-15 PROCEDURE — 3074F SYST BP LT 130 MM HG: CPT | Performed by: NURSE PRACTITIONER

## 2025-05-15 ASSESSMENT — ENCOUNTER SYMPTOMS
MYALGIAS: 1
GASTROINTESTINAL NEGATIVE: 1
NECK PAIN: 0
HEADACHES: 0
BRUISES/BLEEDS EASILY: 0
CONSTITUTIONAL NEGATIVE: 1
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
## 2025-05-28 ENCOUNTER — APPOINTMENT (OUTPATIENT)
Dept: NEUROLOGY | Facility: MEDICAL CENTER | Age: 30
End: 2025-05-28
Attending: SPECIALIST
Payer: COMMERCIAL

## 2025-06-06 ENCOUNTER — APPOINTMENT (OUTPATIENT)
Dept: NEUROLOGY | Facility: MEDICAL CENTER | Age: 30
End: 2025-06-06
Attending: PSYCHIATRY & NEUROLOGY
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
## 2025-08-02 ENCOUNTER — NON-PROVIDER VISIT (OUTPATIENT)
Dept: OCCUPATIONAL MEDICINE | Facility: CLINIC | Age: 30
End: 2025-08-02
Payer: COMMERCIAL

## 2025-08-02 ENCOUNTER — HOSPITAL ENCOUNTER (EMERGENCY)
Facility: MEDICAL CENTER | Age: 30
End: 2025-08-02
Attending: EMERGENCY MEDICINE
Payer: OTHER MISCELLANEOUS

## 2025-08-02 ENCOUNTER — APPOINTMENT (OUTPATIENT)
Dept: RADIOLOGY | Facility: MEDICAL CENTER | Age: 30
End: 2025-08-02
Attending: EMERGENCY MEDICINE
Payer: OTHER MISCELLANEOUS

## 2025-08-02 VITALS
HEIGHT: 69 IN | DIASTOLIC BLOOD PRESSURE: 72 MMHG | HEART RATE: 81 BPM | TEMPERATURE: 98.5 F | OXYGEN SATURATION: 98 % | BODY MASS INDEX: 19.85 KG/M2 | SYSTOLIC BLOOD PRESSURE: 110 MMHG | WEIGHT: 134.04 LBS | RESPIRATION RATE: 16 BRPM

## 2025-08-02 DIAGNOSIS — S60.041A CONTUSION OF RIGHT RING FINGER WITHOUT DAMAGE TO NAIL, INITIAL ENCOUNTER: Primary | ICD-10-CM

## 2025-08-02 PROCEDURE — 99282 EMERGENCY DEPT VISIT SF MDM: CPT

## 2025-08-02 PROCEDURE — 80305 DRUG TEST PRSMV DIR OPT OBS: CPT | Performed by: PREVENTIVE MEDICINE

## 2025-08-07 ENCOUNTER — OCCUPATIONAL MEDICINE (OUTPATIENT)
Dept: OCCUPATIONAL MEDICINE | Facility: CLINIC | Age: 30
End: 2025-08-07
Payer: COMMERCIAL

## 2025-08-07 VITALS
HEART RATE: 88 BPM | TEMPERATURE: 97.6 F | HEIGHT: 69 IN | RESPIRATION RATE: 18 BRPM | SYSTOLIC BLOOD PRESSURE: 104 MMHG | BODY MASS INDEX: 19.73 KG/M2 | WEIGHT: 133.2 LBS | OXYGEN SATURATION: 98 % | DIASTOLIC BLOOD PRESSURE: 66 MMHG

## 2025-08-07 DIAGNOSIS — S60.221D CONTUSION OF RIGHT HAND, SUBSEQUENT ENCOUNTER: Primary | ICD-10-CM

## 2025-08-07 PROCEDURE — 99213 OFFICE O/P EST LOW 20 MIN: CPT | Performed by: PREVENTIVE MEDICINE
